# Patient Record
Sex: MALE | Race: WHITE | Employment: OTHER | URBAN - METROPOLITAN AREA
[De-identification: names, ages, dates, MRNs, and addresses within clinical notes are randomized per-mention and may not be internally consistent; named-entity substitution may affect disease eponyms.]

---

## 2018-06-06 ENCOUNTER — HOSPITAL ENCOUNTER (OUTPATIENT)
Age: 78
Discharge: HOME | End: 2018-06-08
Payer: MEDICARE

## 2018-06-06 LAB
ALBUMIN SERPL-MCNC: 3.8 G/DL (ref 3.5–5.2)
ALP BLD-CCNC: 55 U/L (ref 40–129)
ALT SERPL-CCNC: 20 U/L (ref 0–40)
ANION GAP SERPL CALCULATED.3IONS-SCNC: 14 MMOL/L (ref 7–16)
AST SERPL-CCNC: 19 U/L (ref 0–39)
BASOPHILS ABSOLUTE: 0.05 E9/L (ref 0–0.2)
BASOPHILS RELATIVE PERCENT: 0.6 % (ref 0–2)
BILIRUB SERPL-MCNC: 0.7 MG/DL (ref 0–1.2)
BUN BLDV-MCNC: 18 MG/DL (ref 8–23)
CALCIUM SERPL-MCNC: 8.7 MG/DL (ref 8.6–10.2)
CHLORIDE BLD-SCNC: 100 MMOL/L (ref 98–107)
CHOLESTEROL, TOTAL: 152 MG/DL (ref 0–199)
CO2: 27 MMOL/L (ref 22–29)
CREAT SERPL-MCNC: 1.2 MG/DL (ref 0.7–1.2)
EOSINOPHILS ABSOLUTE: 1.58 E9/L (ref 0.05–0.5)
EOSINOPHILS RELATIVE PERCENT: 17.9 % (ref 0–6)
GFR AFRICAN AMERICAN: >60
GFR NON-AFRICAN AMERICAN: 58 ML/MIN/1.73
GLUCOSE BLD-MCNC: 97 MG/DL (ref 74–109)
HCT VFR BLD CALC: 47.2 % (ref 37–54)
HDLC SERPL-MCNC: 31 MG/DL
HEMOGLOBIN: 15.4 G/DL (ref 12.5–16.5)
IMMATURE GRANULOCYTES #: 0.04 E9/L
IMMATURE GRANULOCYTES %: 0.5 % (ref 0–5)
LDL CHOLESTEROL CALCULATED: 88 MG/DL (ref 0–99)
LYMPHOCYTES ABSOLUTE: 2 E9/L (ref 1.5–4)
LYMPHOCYTES RELATIVE PERCENT: 22.7 % (ref 20–42)
MCH RBC QN AUTO: 30.7 PG (ref 26–35)
MCHC RBC AUTO-ENTMCNC: 32.6 % (ref 32–34.5)
MCV RBC AUTO: 94.2 FL (ref 80–99.9)
MONOCYTES ABSOLUTE: 0.77 E9/L (ref 0.1–0.95)
MONOCYTES RELATIVE PERCENT: 8.7 % (ref 2–12)
NEUTROPHILS ABSOLUTE: 4.38 E9/L (ref 1.8–7.3)
NEUTROPHILS RELATIVE PERCENT: 49.6 % (ref 43–80)
PDW BLD-RTO: 15.5 FL (ref 11.5–15)
PLATELET # BLD: 168 E9/L (ref 130–450)
PMV BLD AUTO: 9.5 FL (ref 7–12)
POTASSIUM SERPL-SCNC: 4.5 MMOL/L (ref 3.5–5)
PROSTATE SPECIFIC ANTIGEN: 7.02 NG/ML (ref 0–4)
RBC # BLD: 5.01 E12/L (ref 3.8–5.8)
SODIUM BLD-SCNC: 141 MMOL/L (ref 132–146)
TOTAL PROTEIN: 6.6 G/DL (ref 6.4–8.3)
TRIGL SERPL-MCNC: 166 MG/DL (ref 0–149)
TSH SERPL DL<=0.05 MIU/L-ACNC: 1.25 UIU/ML (ref 0.27–4.2)
VITAMIN D 25-HYDROXY: 29 NG/ML (ref 30–100)
VLDLC SERPL CALC-MCNC: 33 MG/DL
WBC # BLD: 8.8 E9/L (ref 4.5–11.5)

## 2018-06-06 PROCEDURE — 84443 ASSAY THYROID STIM HORMONE: CPT

## 2018-06-06 PROCEDURE — 82306 VITAMIN D 25 HYDROXY: CPT

## 2018-06-06 PROCEDURE — 85025 COMPLETE CBC W/AUTO DIFF WBC: CPT

## 2018-06-06 PROCEDURE — 80053 COMPREHEN METABOLIC PANEL: CPT

## 2018-06-06 PROCEDURE — 80061 LIPID PANEL: CPT

## 2018-06-06 PROCEDURE — G0103 PSA SCREENING: HCPCS

## 2020-05-26 ENCOUNTER — TELEPHONE (OUTPATIENT)
Dept: PRIMARY CARE CLINIC | Age: 80
End: 2020-05-26

## 2020-05-26 NOTE — TELEPHONE ENCOUNTER
Dr Lopez Pitch pt asking for ref to Dr Leo Arreaga in [place] for ortho. He has right hip pain. He was previously seeing Denise Agee.  Can you place referral

## 2020-06-02 ENCOUNTER — TELEPHONE (OUTPATIENT)
Dept: PRIMARY CARE CLINIC | Age: 80
End: 2020-06-02

## 2020-06-08 RX ORDER — ALBUTEROL SULFATE 90 UG/1
2 AEROSOL, METERED RESPIRATORY (INHALATION) EVERY 4 HOURS PRN
COMMUNITY
End: 2021-05-09

## 2020-06-08 RX ORDER — BUDESONIDE AND FORMOTEROL FUMARATE DIHYDRATE 80; 4.5 UG/1; UG/1
2 AEROSOL RESPIRATORY (INHALATION) 2 TIMES DAILY
COMMUNITY
End: 2021-05-09

## 2020-06-08 RX ORDER — LOSARTAN POTASSIUM 50 MG/1
50 TABLET ORAL DAILY
COMMUNITY
End: 2021-06-02 | Stop reason: SDUPTHER

## 2020-06-09 ENCOUNTER — HOSPITAL ENCOUNTER (OUTPATIENT)
Dept: GENERAL RADIOLOGY | Age: 80
Discharge: HOME | End: 2020-06-11
Payer: MEDICARE

## 2020-06-09 ENCOUNTER — HOSPITAL ENCOUNTER (OUTPATIENT)
Age: 80
Discharge: HOME | End: 2020-06-11
Payer: MEDICARE

## 2020-06-09 ENCOUNTER — HOSPITAL ENCOUNTER (OUTPATIENT)
Age: 80
Discharge: HOME | End: 2020-06-09
Payer: MEDICARE

## 2020-06-09 LAB
ALBUMIN SERPL-MCNC: 3.9 G/DL (ref 3.5–5.2)
ALP BLD-CCNC: 60 U/L (ref 40–129)
ALT SERPL-CCNC: 16 U/L (ref 0–40)
ANION GAP SERPL CALCULATED.3IONS-SCNC: 6 MMOL/L (ref 7–16)
AST SERPL-CCNC: 18 U/L (ref 0–39)
BASOPHILS ABSOLUTE: 0.04 E9/L (ref 0–0.2)
BASOPHILS RELATIVE PERCENT: 0.6 % (ref 0–2)
BILIRUB SERPL-MCNC: 0.8 MG/DL (ref 0–1.2)
BILIRUBIN URINE: NEGATIVE
BLOOD, URINE: NEGATIVE
BUN BLDV-MCNC: 17 MG/DL (ref 8–23)
CALCIUM SERPL-MCNC: 9.1 MG/DL (ref 8.6–10.2)
CHLORIDE BLD-SCNC: 101 MMOL/L (ref 98–107)
CLARITY: CLEAR
CO2: 30 MMOL/L (ref 22–29)
COLOR: YELLOW
CREAT SERPL-MCNC: 1.2 MG/DL (ref 0.7–1.2)
EOSINOPHILS ABSOLUTE: 0.67 E9/L (ref 0.05–0.5)
EOSINOPHILS RELATIVE PERCENT: 9.7 % (ref 0–6)
GFR AFRICAN AMERICAN: >60
GFR NON-AFRICAN AMERICAN: 58 ML/MIN/1.73
GLUCOSE BLD-MCNC: 100 MG/DL (ref 74–99)
GLUCOSE URINE: NEGATIVE MG/DL
HCT VFR BLD CALC: 45.8 % (ref 37–54)
HEMOGLOBIN: 15.8 G/DL (ref 12.5–16.5)
IMMATURE GRANULOCYTES #: 0.04 E9/L
IMMATURE GRANULOCYTES %: 0.6 % (ref 0–5)
KETONES, URINE: NEGATIVE MG/DL
LEUKOCYTE ESTERASE, URINE: NEGATIVE
LYMPHOCYTES ABSOLUTE: 1.51 E9/L (ref 1.5–4)
LYMPHOCYTES RELATIVE PERCENT: 21.9 % (ref 20–42)
MCH RBC QN AUTO: 31.7 PG (ref 26–35)
MCHC RBC AUTO-ENTMCNC: 34.5 % (ref 32–34.5)
MCV RBC AUTO: 91.8 FL (ref 80–99.9)
MONOCYTES ABSOLUTE: 0.54 E9/L (ref 0.1–0.95)
MONOCYTES RELATIVE PERCENT: 7.8 % (ref 2–12)
NEUTROPHILS ABSOLUTE: 4.11 E9/L (ref 1.8–7.3)
NEUTROPHILS RELATIVE PERCENT: 59.4 % (ref 43–80)
NITRITE, URINE: NEGATIVE
PDW BLD-RTO: 14 FL (ref 11.5–15)
PH UA: 5.5 (ref 5–9)
PLATELET # BLD: 161 E9/L (ref 130–450)
PMV BLD AUTO: 9.1 FL (ref 7–12)
POTASSIUM SERPL-SCNC: 4.1 MMOL/L (ref 3.5–5)
PROTEIN UA: NEGATIVE MG/DL
RBC # BLD: 4.99 E12/L (ref 3.8–5.8)
SODIUM BLD-SCNC: 137 MMOL/L (ref 132–146)
SPECIFIC GRAVITY UA: 1.02 (ref 1–1.03)
TOTAL PROTEIN: 6.8 G/DL (ref 6.4–8.3)
UROBILINOGEN, URINE: 1 E.U./DL
WBC # BLD: 6.9 E9/L (ref 4.5–11.5)

## 2020-06-09 PROCEDURE — 80053 COMPREHEN METABOLIC PANEL: CPT

## 2020-06-09 PROCEDURE — 87088 URINE BACTERIA CULTURE: CPT

## 2020-06-09 PROCEDURE — 85025 COMPLETE CBC W/AUTO DIFF WBC: CPT

## 2020-06-09 PROCEDURE — 71046 X-RAY EXAM CHEST 2 VIEWS: CPT

## 2020-06-09 PROCEDURE — 36415 COLL VENOUS BLD VENIPUNCTURE: CPT

## 2020-06-09 PROCEDURE — 81003 URINALYSIS AUTO W/O SCOPE: CPT

## 2020-06-11 LAB — URINE CULTURE, ROUTINE: NORMAL

## 2020-06-12 ENCOUNTER — OFFICE VISIT (OUTPATIENT)
Dept: PRIMARY CARE CLINIC | Age: 80
End: 2020-06-12
Payer: MEDICARE

## 2020-06-12 VITALS
TEMPERATURE: 98.1 F | SYSTOLIC BLOOD PRESSURE: 132 MMHG | WEIGHT: 206 LBS | BODY MASS INDEX: 27.94 KG/M2 | HEART RATE: 78 BPM | DIASTOLIC BLOOD PRESSURE: 70 MMHG | OXYGEN SATURATION: 98 %

## 2020-06-12 PROCEDURE — 4040F PNEUMOC VAC/ADMIN/RCVD: CPT | Performed by: FAMILY MEDICINE

## 2020-06-12 PROCEDURE — 99214 OFFICE O/P EST MOD 30 MIN: CPT | Performed by: FAMILY MEDICINE

## 2020-06-12 PROCEDURE — G8419 CALC BMI OUT NRM PARAM NOF/U: HCPCS | Performed by: FAMILY MEDICINE

## 2020-06-12 PROCEDURE — 4004F PT TOBACCO SCREEN RCVD TLK: CPT | Performed by: FAMILY MEDICINE

## 2020-06-12 PROCEDURE — 1123F ACP DISCUSS/DSCN MKR DOCD: CPT | Performed by: FAMILY MEDICINE

## 2020-06-12 PROCEDURE — G8428 CUR MEDS NOT DOCUMENT: HCPCS | Performed by: FAMILY MEDICINE

## 2020-06-12 RX ORDER — BUDESONIDE AND FORMOTEROL FUMARATE DIHYDRATE 80; 4.5 UG/1; UG/1
AEROSOL RESPIRATORY (INHALATION)
COMMUNITY
End: 2020-07-14

## 2020-06-12 RX ORDER — ALBUTEROL SULFATE 90 UG/1
AEROSOL, METERED RESPIRATORY (INHALATION)
COMMUNITY
End: 2020-07-14

## 2020-06-12 RX ORDER — FLUTICASONE PROPIONATE 50 MCG
SPRAY, SUSPENSION (ML) NASAL
COMMUNITY
End: 2020-07-14

## 2020-06-12 RX ORDER — METOPROLOL SUCCINATE 50 MG/1
TABLET, EXTENDED RELEASE ORAL
COMMUNITY
Start: 2020-04-08 | End: 2021-10-11 | Stop reason: SDUPTHER

## 2020-06-12 RX ORDER — METOPROLOL SUCCINATE 25 MG/1
TABLET, EXTENDED RELEASE ORAL
COMMUNITY
Start: 2016-10-10 | End: 2020-07-14

## 2020-06-12 RX ORDER — APIXABAN 5 MG/1
TABLET, FILM COATED ORAL
COMMUNITY
Start: 2020-03-20 | End: 2020-07-14

## 2020-06-12 RX ORDER — GUAIFENESIN 600 MG/1
TABLET, EXTENDED RELEASE ORAL
COMMUNITY
End: 2020-07-14

## 2020-06-12 RX ORDER — OMEPRAZOLE 40 MG/1
CAPSULE, DELAYED RELEASE ORAL
COMMUNITY
Start: 2016-08-11 | End: 2020-07-14

## 2020-06-12 RX ORDER — LOSARTAN POTASSIUM 50 MG/1
TABLET ORAL
COMMUNITY
End: 2020-07-14

## 2020-06-12 RX ORDER — ONDANSETRON 4 MG/1
TABLET, ORALLY DISINTEGRATING ORAL
COMMUNITY
Start: 2017-03-29 | End: 2020-07-14

## 2020-06-12 RX ORDER — FLECAINIDE ACETATE 100 MG/1
TABLET ORAL
COMMUNITY
End: 2020-07-14

## 2020-06-12 RX ORDER — DOXYCYCLINE HYCLATE 100 MG/1
CAPSULE ORAL
COMMUNITY
End: 2020-07-14

## 2020-06-12 ASSESSMENT — ENCOUNTER SYMPTOMS
GASTROINTESTINAL NEGATIVE: 1
EYES NEGATIVE: 1
ALLERGIC/IMMUNOLOGIC NEGATIVE: 1
RESPIRATORY NEGATIVE: 1

## 2020-06-12 NOTE — PROGRESS NOTES
aneurysm    Stroke Father     Other Father         pacemaker    Cancer Sister      Social History     Tobacco Use    Smoking status: Never Smoker    Smokeless tobacco: Never Used   Substance Use Topics    Alcohol use: Yes     Alcohol/week: 2.0 standard drinks     Types: 1 Cans of beer, 1 Shots of liquor per week     Comment: 2 drinks 5 days a week    Drug use: No        Vitals:    06/12/20 1039   BP: 132/70   Pulse: 78   Temp: 98.1 °F (36.7 °C)   SpO2: 98%   Weight: 206 lb (93.4 kg)       Physical Exam  Vitals signs reviewed. Constitutional:       Appearance: Normal appearance. HENT:      Head: Normocephalic and atraumatic. Right Ear: Tympanic membrane, ear canal and external ear normal.      Left Ear: Tympanic membrane, ear canal and external ear normal.      Nose: Nose normal.   Eyes:      Extraocular Movements: Extraocular movements intact. Conjunctiva/sclera: Conjunctivae normal.      Pupils: Pupils are equal, round, and reactive to light. Neck:      Musculoskeletal: Normal range of motion and neck supple. Vascular: No carotid bruit. Cardiovascular:      Rate and Rhythm: Normal rate and regular rhythm. Heart sounds: No murmur. Pulmonary:      Effort: Pulmonary effort is normal.      Breath sounds: Normal breath sounds. Abdominal:      General: Abdomen is flat. Bowel sounds are normal.      Palpations: Abdomen is soft. Musculoskeletal:         General: Tenderness present. Skin:     General: Skin is warm and dry. Neurological:      General: No focal deficit present. Mental Status: He is alert and oriented to person, place, and time. Gait: Gait abnormal.   Psychiatric:         Mood and Affect: Mood normal.         Behavior: Behavior normal.         Thought Content:  Thought content normal.         Judgment: Judgment normal.         Assessment and Plan:  Uday Man was seen today for pre-op exam.    Diagnoses and all orders for this visit:    Pre-op

## 2020-06-15 ENCOUNTER — TELEPHONE (OUTPATIENT)
Dept: PRIMARY CARE CLINIC | Age: 80
End: 2020-06-15

## 2020-06-15 NOTE — TELEPHONE ENCOUNTER
Patient in need of MRSA testing. Patient would like to have done here in office tomorrow if possible due to his surgery being next week. Please place order and let patient know when he can come in.

## 2020-06-16 ENCOUNTER — NURSE ONLY (OUTPATIENT)
Dept: PRIMARY CARE CLINIC | Age: 80
End: 2020-06-16

## 2020-06-16 ENCOUNTER — HOSPITAL ENCOUNTER (OUTPATIENT)
Age: 80
Discharge: HOME | End: 2020-06-18
Payer: MEDICARE

## 2020-06-16 PROCEDURE — 87081 CULTURE SCREEN ONLY: CPT

## 2020-06-18 LAB — MRSA CULTURE ONLY: NORMAL

## 2020-07-14 ENCOUNTER — OFFICE VISIT (OUTPATIENT)
Dept: PRIMARY CARE CLINIC | Age: 80
End: 2020-07-14
Payer: MEDICARE

## 2020-07-14 VITALS
TEMPERATURE: 97.6 F | WEIGHT: 198 LBS | HEIGHT: 72 IN | DIASTOLIC BLOOD PRESSURE: 60 MMHG | RESPIRATION RATE: 18 BRPM | SYSTOLIC BLOOD PRESSURE: 115 MMHG | OXYGEN SATURATION: 98 % | HEART RATE: 64 BPM | BODY MASS INDEX: 26.82 KG/M2

## 2020-07-14 PROBLEM — R39.11 BENIGN PROSTATIC HYPERPLASIA WITH URINARY HESITANCY: Status: ACTIVE | Noted: 2020-07-14

## 2020-07-14 PROBLEM — N40.1 BENIGN PROSTATIC HYPERPLASIA WITH URINARY HESITANCY: Status: ACTIVE | Noted: 2020-07-14

## 2020-07-14 PROBLEM — Z79.01 ANTICOAGULATED: Status: ACTIVE | Noted: 2020-07-14

## 2020-07-14 PROBLEM — M16.11 PRIMARY OSTEOARTHRITIS OF RIGHT HIP: Status: ACTIVE | Noted: 2020-07-14

## 2020-07-14 PROBLEM — Z86.79 HISTORY OF ATRIAL FIBRILLATION: Status: ACTIVE | Noted: 2020-07-14

## 2020-07-14 PROCEDURE — 99214 OFFICE O/P EST MOD 30 MIN: CPT | Performed by: INTERNAL MEDICINE

## 2020-07-14 ASSESSMENT — PATIENT HEALTH QUESTIONNAIRE - PHQ9
SUM OF ALL RESPONSES TO PHQ9 QUESTIONS 1 & 2: 0
2. FEELING DOWN, DEPRESSED OR HOPELESS: 0
1. LITTLE INTEREST OR PLEASURE IN DOING THINGS: 0
SUM OF ALL RESPONSES TO PHQ QUESTIONS 1-9: 0
SUM OF ALL RESPONSES TO PHQ QUESTIONS 1-9: 0

## 2020-07-19 ENCOUNTER — HOSPITAL ENCOUNTER (EMERGENCY)
Age: 80
Discharge: HOME | End: 2020-07-20
Attending: EMERGENCY MEDICINE
Payer: MEDICARE

## 2020-07-19 ENCOUNTER — APPOINTMENT (OUTPATIENT)
Dept: GENERAL RADIOLOGY | Age: 80
End: 2020-07-19
Payer: MEDICARE

## 2020-07-19 ENCOUNTER — APPOINTMENT (OUTPATIENT)
Dept: ULTRASOUND IMAGING | Age: 80
End: 2020-07-19
Payer: MEDICARE

## 2020-07-19 LAB
ALBUMIN SERPL-MCNC: 3.8 G/DL (ref 3.5–5.2)
ALP BLD-CCNC: 108 U/L (ref 40–129)
ALT SERPL-CCNC: 16 U/L (ref 0–40)
ANION GAP SERPL CALCULATED.3IONS-SCNC: 10 MMOL/L (ref 7–16)
AST SERPL-CCNC: 16 U/L (ref 0–39)
BASOPHILS ABSOLUTE: 0.02 E9/L (ref 0–0.2)
BASOPHILS RELATIVE PERCENT: 0.3 % (ref 0–2)
BILIRUB SERPL-MCNC: 0.4 MG/DL (ref 0–1.2)
BUN BLDV-MCNC: 20 MG/DL (ref 8–23)
CALCIUM SERPL-MCNC: 9.2 MG/DL (ref 8.6–10.2)
CHLORIDE BLD-SCNC: 102 MMOL/L (ref 98–107)
CO2: 29 MMOL/L (ref 22–29)
CREAT SERPL-MCNC: 1.3 MG/DL (ref 0.7–1.2)
EOSINOPHILS ABSOLUTE: 0.09 E9/L (ref 0.05–0.5)
EOSINOPHILS RELATIVE PERCENT: 1.4 % (ref 0–6)
GFR AFRICAN AMERICAN: >60
GFR NON-AFRICAN AMERICAN: 53 ML/MIN/1.73
GLUCOSE BLD-MCNC: 112 MG/DL (ref 74–99)
HCT VFR BLD CALC: 38.1 % (ref 37–54)
HEMOGLOBIN: 12.8 G/DL (ref 12.5–16.5)
IMMATURE GRANULOCYTES #: 0.03 E9/L
IMMATURE GRANULOCYTES %: 0.5 % (ref 0–5)
LACTIC ACID, SEPSIS: 1.2 MMOL/L (ref 0.5–1.9)
LIPASE: 32 U/L (ref 13–60)
LYMPHOCYTES ABSOLUTE: 1.03 E9/L (ref 1.5–4)
LYMPHOCYTES RELATIVE PERCENT: 16.5 % (ref 20–42)
MCH RBC QN AUTO: 31.2 PG (ref 26–35)
MCHC RBC AUTO-ENTMCNC: 33.6 % (ref 32–34.5)
MCV RBC AUTO: 92.9 FL (ref 80–99.9)
MONOCYTES ABSOLUTE: 0.6 E9/L (ref 0.1–0.95)
MONOCYTES RELATIVE PERCENT: 9.6 % (ref 2–12)
NEUTROPHILS ABSOLUTE: 4.48 E9/L (ref 1.8–7.3)
NEUTROPHILS RELATIVE PERCENT: 71.7 % (ref 43–80)
PDW BLD-RTO: 13.9 FL (ref 11.5–15)
PLATELET # BLD: 173 E9/L (ref 130–450)
PMV BLD AUTO: 9.5 FL (ref 7–12)
POTASSIUM REFLEX MAGNESIUM: 4.5 MMOL/L (ref 3.5–5)
RBC # BLD: 4.1 E12/L (ref 3.8–5.8)
SODIUM BLD-SCNC: 141 MMOL/L (ref 132–146)
TOTAL PROTEIN: 6.7 G/DL (ref 6.4–8.3)
TROPONIN: <0.01 NG/ML (ref 0–0.03)
WBC # BLD: 6.3 E9/L (ref 4.5–11.5)

## 2020-07-19 PROCEDURE — 93005 ELECTROCARDIOGRAM TRACING: CPT | Performed by: STUDENT IN AN ORGANIZED HEALTH CARE EDUCATION/TRAINING PROGRAM

## 2020-07-19 PROCEDURE — 85025 COMPLETE CBC W/AUTO DIFF WBC: CPT

## 2020-07-19 PROCEDURE — 99285 EMERGENCY DEPT VISIT HI MDM: CPT

## 2020-07-19 PROCEDURE — 83605 ASSAY OF LACTIC ACID: CPT

## 2020-07-19 PROCEDURE — 83690 ASSAY OF LIPASE: CPT

## 2020-07-19 PROCEDURE — 71045 X-RAY EXAM CHEST 1 VIEW: CPT

## 2020-07-19 PROCEDURE — 80053 COMPREHEN METABOLIC PANEL: CPT

## 2020-07-19 PROCEDURE — 84484 ASSAY OF TROPONIN QUANT: CPT

## 2020-07-19 PROCEDURE — 76705 ECHO EXAM OF ABDOMEN: CPT

## 2020-07-19 ASSESSMENT — PAIN DESCRIPTION - FREQUENCY: FREQUENCY: CONTINUOUS

## 2020-07-19 ASSESSMENT — PAIN DESCRIPTION - LOCATION: LOCATION: ABDOMEN;CHEST

## 2020-07-19 ASSESSMENT — PAIN SCALES - GENERAL: PAINLEVEL_OUTOF10: 4

## 2020-07-19 ASSESSMENT — PAIN DESCRIPTION - ORIENTATION: ORIENTATION: LEFT

## 2020-07-19 ASSESSMENT — PAIN DESCRIPTION - PAIN TYPE: TYPE: ACUTE PAIN

## 2020-07-19 ASSESSMENT — PAIN DESCRIPTION - DESCRIPTORS: DESCRIPTORS: ACHING

## 2020-07-20 VITALS
OXYGEN SATURATION: 97 % | BODY MASS INDEX: 26.82 KG/M2 | DIASTOLIC BLOOD PRESSURE: 76 MMHG | HEART RATE: 75 BPM | WEIGHT: 198 LBS | RESPIRATION RATE: 14 BRPM | HEIGHT: 72 IN | SYSTOLIC BLOOD PRESSURE: 159 MMHG | TEMPERATURE: 99 F

## 2020-07-20 LAB
EKG ATRIAL RATE: 76 BPM
EKG P AXIS: 102 DEGREES
EKG P-R INTERVAL: 224 MS
EKG Q-T INTERVAL: 404 MS
EKG QRS DURATION: 134 MS
EKG QTC CALCULATION (BAZETT): 454 MS
EKG R AXIS: -63 DEGREES
EKG T AXIS: 25 DEGREES
EKG VENTRICULAR RATE: 76 BPM

## 2020-07-20 PROCEDURE — 93010 ELECTROCARDIOGRAM REPORT: CPT | Performed by: INTERNAL MEDICINE

## 2020-07-20 ASSESSMENT — ENCOUNTER SYMPTOMS
COUGH: 0
EYE PAIN: 0
SINUS PRESSURE: 0
WHEEZING: 0
SORE THROAT: 0
VOMITING: 0
SHORTNESS OF BREATH: 0
BACK PAIN: 1
EYE REDNESS: 0
NAUSEA: 0
DIARRHEA: 0
EYE DISCHARGE: 0
CONSTIPATION: 0
ABDOMINAL PAIN: 1

## 2020-07-21 ENCOUNTER — OFFICE VISIT (OUTPATIENT)
Dept: PRIMARY CARE CLINIC | Age: 80
End: 2020-07-21
Payer: MEDICARE

## 2020-07-21 VITALS
RESPIRATION RATE: 18 BRPM | WEIGHT: 198 LBS | TEMPERATURE: 97.2 F | SYSTOLIC BLOOD PRESSURE: 110 MMHG | DIASTOLIC BLOOD PRESSURE: 68 MMHG | HEART RATE: 74 BPM | OXYGEN SATURATION: 96 % | HEIGHT: 72 IN | BODY MASS INDEX: 26.82 KG/M2

## 2020-07-21 PROBLEM — R10.10 UPPER ABDOMINAL PAIN: Status: ACTIVE | Noted: 2020-07-21

## 2020-07-21 PROCEDURE — 99213 OFFICE O/P EST LOW 20 MIN: CPT | Performed by: INTERNAL MEDICINE

## 2020-07-21 NOTE — PROGRESS NOTES
Kinza Osorio  7/21/20     Chief Complaint   Patient presents with    Follow-up     7/19/20 ER for chest pain and stomache pain        No Known Allergies     Current Outpatient Medications   Medication Sig Dispense Refill    metoprolol succinate (TOPROL XL) 50 MG extended release tablet TAKE 1 TABLET BY MOUTH ONCE DAILY      losartan (COZAAR) 50 MG tablet Take 50 mg by mouth daily      budesonide-formoterol (SYMBICORT) 80-4.5 MCG/ACT AERO Inhale 2 puffs into the lungs 2 times daily      albuterol sulfate HFA (VENTOLIN HFA) 108 (90 Base) MCG/ACT inhaler Inhale 2 puffs into the lungs every 4 hours as needed for Wheezing      apixaban (ELIQUIS) 5 MG TABS tablet Take 1 tablet by mouth 2 times daily 60 tablet 5    omeprazole (PRILOSEC) 40 MG capsule Take 40 mg by mouth daily        No current facility-administered medications for this visit. HPI: Patient comes in for follow-up visit. He was in the emergency room 2 days ago planing of upper abdominal pain which started spontaneously. Chest x-ray revealed no acute pathology. Gallbladder ultrasound revealed a small stone or polyp adherent to the wall of the gallbladder. There is no evidence of gallbladder wall thickening or inflammation. His symptoms spontaneously resolved while he was in the ER. He was advised to have a CAT scan of the abdomen but he declined and left AMA. He states that he has not had any further symptoms since then. He is going to see his orthopedic surgeon in [facility] for a follow-up visit regarding his recent right total hip arthroplasty and he is doing fine in that regard and will be starting physical therapy after his visit there. Remains on all of his usual meds and supplements the same as listed on his med list.    Review of Systems: as per HPI      Physical Exam: Patient appears to be in no distress. He is alert and oriented. Breathing comfortably. Heart regular rhythm no murmurs gallops or rubs. Lungs clear.   Abdomen soft and nontender. No masses organomegaly noted. Bowel sounds normal.      Assessment:      Upper abdominal pain, transient and etiology uncertain. Possible small gallstone noted on ultrasound. Cannot rule out possibility of spontaneously passed common bile duct stone. Discussion Notes: His labs and imaging done at the emergency room were reviewed. No further evaluation or treatment at this time and if she has any recurring symptoms. He will follow-up with his orthopedic surgeon as per his instructions and proceed with physical therapy as ordered. If he has any recurring upper abdominal discomfort he will need further evaluation. He is advised to return to the ER if his symptoms recur.

## 2020-09-09 ENCOUNTER — TELEPHONE (OUTPATIENT)
Dept: PRIMARY CARE CLINIC | Age: 80
End: 2020-09-09

## 2020-09-09 NOTE — TELEPHONE ENCOUNTER
Ask him if he is passing any blood. Ask him if he has any fever or chills or abdominal pain. If no other symptoms other than diarrhea he may try original Pepto-Bismol in addition to the Imodium and see if that helps. He should avoid any dairy products or magnesium or magnesium-based antacids such as Mylanta or Maalox.

## 2020-11-02 ENCOUNTER — OFFICE VISIT (OUTPATIENT)
Dept: PRIMARY CARE CLINIC | Age: 80
End: 2020-11-02
Payer: MEDICARE

## 2020-11-02 VITALS
WEIGHT: 198 LBS | BODY MASS INDEX: 26.82 KG/M2 | DIASTOLIC BLOOD PRESSURE: 80 MMHG | HEART RATE: 76 BPM | SYSTOLIC BLOOD PRESSURE: 112 MMHG | TEMPERATURE: 98.1 F | OXYGEN SATURATION: 98 % | HEIGHT: 72 IN

## 2020-11-02 PROCEDURE — 1036F TOBACCO NON-USER: CPT | Performed by: NURSE PRACTITIONER

## 2020-11-02 PROCEDURE — G8427 DOCREV CUR MEDS BY ELIG CLIN: HCPCS | Performed by: NURSE PRACTITIONER

## 2020-11-02 PROCEDURE — 4040F PNEUMOC VAC/ADMIN/RCVD: CPT | Performed by: NURSE PRACTITIONER

## 2020-11-02 PROCEDURE — G8484 FLU IMMUNIZE NO ADMIN: HCPCS | Performed by: NURSE PRACTITIONER

## 2020-11-02 PROCEDURE — G8417 CALC BMI ABV UP PARAM F/U: HCPCS | Performed by: NURSE PRACTITIONER

## 2020-11-02 PROCEDURE — 1123F ACP DISCUSS/DSCN MKR DOCD: CPT | Performed by: NURSE PRACTITIONER

## 2020-11-02 PROCEDURE — 99213 OFFICE O/P EST LOW 20 MIN: CPT | Performed by: NURSE PRACTITIONER

## 2020-11-02 NOTE — PROGRESS NOTES
Chief Complaint   Fever (Started 3 days ago) and Fatigue      History of Present Illness   Source of history provided by:  patient. Shae Monk is a 79 y.o. old male who presents to the flu clinic with complaints of Fever, Chills and Fatigue x 3 days. States symptoms have improved since onset. Has been taking Acetaminophen without symptomatic relief. Denies any Cough, Shortness of breath, Nausea, Vomiting, Chest Pain, Abdominal Pain, Rash, Lethargy or Close contact with a lab confirmed COVID-19 patient within 14 days of symptom onset . Denies any hx of asthma, COPD or emphysema. ROS   Pertinent positives and negatives are stated within HPI, all other systems reviewed and are negative. Past Medical History:  has a past medical history of Aortic valve regurgitation, Arrhythmia, Atrial fibrillation ([address] Dizziness, GERD (gastroesophageal reflux disease), Heartburn, History of stress test, Hypertension, and Lightheadedness. Past Surgical History:  has a past surgical history that includes Tonsillectomy; pacemaker placement; and other surgical history. Social History:  reports that he has never smoked. He has never used smokeless tobacco. He reports current alcohol use of about 2.0 standard drinks of alcohol per week. He reports that he does not use drugs. Family History: family history includes Cancer in his sister; Other in his father and mother; Stroke in his father. Allergies: Patient has no known allergies. Physical Exam   Vital Signs:  /80   Pulse 76   Temp 98.1 °F (36.7 °C)   Ht 6' (1.829 m)   Wt 198 lb (89.8 kg)   SpO2 98%   BMI 26.85 kg/m²    Oxygen Saturation Interpretation: Normal.    Constitutional:  Alert, development consistent with age. NAD. Head:  NC/NT. Airway patent. Ears: TMs clear bilaterally. Canals without exudate or swelling bilaterally. Mouth: Posterior pharynx with mild erythema and clear postnasal drip. There is no tonsillar hypertrophy or exudate. Neck:  Normal ROM. Supple. There is no anterior cervical adenopathy noted. Lungs: CTAB without wheezes, rales, or rhonchi. CV:  Regular rate and rhythm, normal heart sounds, without pathological murmurs, ectopy, gallops, or rubs. Skin:  Normal turgor. Warm, dry, without visible rash. Lymphatic: No lymphangitis or adenopathy noted. Neurological:  Oriented. Motor functions intact. Lab / Imaging Results   (All laboratory and radiology results have been personally reviewed by myself)  Labs:  No results found for this visit on 11/02/20. Imaging: All Radiology results interpreted by Radiologist unless otherwise noted. No results found. Medical Decision Making   Pt non-toxic, in no apparent distress and stable at time of discharge. Assessment/Plan   Callie Mcfadden was seen today for fever and fatigue. Diagnoses and all orders for this visit:    Viral illness    Encounter for laboratory testing for COVID-19 virus  -     COVID-19 Ambulatory; Future      COVID-19 swab obtained and pending, will call with results once available. Advised self-quarantine at home in the interim. Increase fluids and rest. Symptomatic relief discussed including Tylenol prn pain/fever. Schedule f/u with PCP in 7-10 days if symptoms persist. ED sooner if symptoms worsen or change. ED immediately with high or refractory fever, progressive SOB, dyspnea, CP, calf pain/swelling, shaking chills, vomiting, abdominal pain, lethargy, flank pain, or decreased urinary output. Pt verbalizes understanding and is in agreement with plan of care. All questions answered. Anat & Frederick, APRN - CNP    This visit was provided as a focused evaluation during the COVID -19 pandemic/national emergency. A comprehensive review of all previous patient history and testing was not conducted. Pertinent findings were elicited during the visit.

## 2020-11-03 DIAGNOSIS — Z20.822 ENCOUNTER FOR LABORATORY TESTING FOR COVID-19 VIRUS: ICD-10-CM

## 2020-11-04 LAB
SARS-COV-2: NOT DETECTED
SOURCE: NORMAL

## 2020-11-21 ENCOUNTER — HOSPITAL ENCOUNTER (EMERGENCY)
Age: 80
Discharge: HOME | End: 2020-11-21
Attending: EMERGENCY MEDICINE
Payer: MEDICARE

## 2020-11-21 VITALS
OXYGEN SATURATION: 97 % | WEIGHT: 195 LBS | HEART RATE: 72 BPM | DIASTOLIC BLOOD PRESSURE: 82 MMHG | TEMPERATURE: 98 F | HEIGHT: 72 IN | RESPIRATION RATE: 16 BRPM | SYSTOLIC BLOOD PRESSURE: 194 MMHG | BODY MASS INDEX: 26.41 KG/M2

## 2020-11-21 LAB
ALBUMIN SERPL-MCNC: 3.8 G/DL (ref 3.5–5.2)
ALP BLD-CCNC: 77 U/L (ref 40–129)
ALT SERPL-CCNC: 12 U/L (ref 0–40)
ANION GAP SERPL CALCULATED.3IONS-SCNC: 5 MMOL/L (ref 7–16)
AST SERPL-CCNC: 21 U/L (ref 0–39)
BASOPHILS ABSOLUTE: 0.01 E9/L (ref 0–0.2)
BASOPHILS RELATIVE PERCENT: 0.2 % (ref 0–2)
BILIRUB SERPL-MCNC: 0.8 MG/DL (ref 0–1.2)
BUN BLDV-MCNC: 17 MG/DL (ref 8–23)
CALCIUM SERPL-MCNC: 8.9 MG/DL (ref 8.6–10.2)
CHLORIDE BLD-SCNC: 103 MMOL/L (ref 98–107)
CO2: 29 MMOL/L (ref 22–29)
CREAT SERPL-MCNC: 1.2 MG/DL (ref 0.7–1.2)
EOSINOPHILS ABSOLUTE: 0.07 E9/L (ref 0.05–0.5)
EOSINOPHILS RELATIVE PERCENT: 1.7 % (ref 0–6)
GFR AFRICAN AMERICAN: >60
GFR NON-AFRICAN AMERICAN: 58 ML/MIN/1.73
GLUCOSE BLD-MCNC: 114 MG/DL (ref 74–99)
HCT VFR BLD CALC: 43.3 % (ref 37–54)
HEMOGLOBIN: 15 G/DL (ref 12.5–16.5)
IMMATURE GRANULOCYTES #: 0.03 E9/L
IMMATURE GRANULOCYTES %: 0.7 % (ref 0–5)
LACTIC ACID: 1 MMOL/L (ref 0.5–2.2)
LYMPHOCYTES ABSOLUTE: 0.83 E9/L (ref 1.5–4)
LYMPHOCYTES RELATIVE PERCENT: 20.1 % (ref 20–42)
MCH RBC QN AUTO: 31.3 PG (ref 26–35)
MCHC RBC AUTO-ENTMCNC: 34.6 % (ref 32–34.5)
MCV RBC AUTO: 90.4 FL (ref 80–99.9)
MONOCYTES ABSOLUTE: 0.51 E9/L (ref 0.1–0.95)
MONOCYTES RELATIVE PERCENT: 12.3 % (ref 2–12)
NEUTROPHILS ABSOLUTE: 2.68 E9/L (ref 1.8–7.3)
NEUTROPHILS RELATIVE PERCENT: 65 % (ref 43–80)
PDW BLD-RTO: 14.2 FL (ref 11.5–15)
PLATELET # BLD: 122 E9/L (ref 130–450)
PMV BLD AUTO: 8.5 FL (ref 7–12)
POTASSIUM SERPL-SCNC: 4.5 MMOL/L (ref 3.5–5)
RBC # BLD: 4.79 E12/L (ref 3.8–5.8)
SODIUM BLD-SCNC: 137 MMOL/L (ref 132–146)
TOTAL PROTEIN: 7.1 G/DL (ref 6.4–8.3)
WBC # BLD: 4.1 E9/L (ref 4.5–11.5)

## 2020-11-21 PROCEDURE — 99283 EMERGENCY DEPT VISIT LOW MDM: CPT

## 2020-11-21 PROCEDURE — 6370000000 HC RX 637 (ALT 250 FOR IP): Performed by: EMERGENCY MEDICINE

## 2020-11-21 PROCEDURE — 85025 COMPLETE CBC W/AUTO DIFF WBC: CPT

## 2020-11-21 PROCEDURE — 80053 COMPREHEN METABOLIC PANEL: CPT

## 2020-11-21 PROCEDURE — 83605 ASSAY OF LACTIC ACID: CPT

## 2020-11-21 RX ORDER — TETRACAINE HYDROCHLORIDE 5 MG/ML
1 SOLUTION OPHTHALMIC ONCE
Status: COMPLETED | OUTPATIENT
Start: 2020-11-21 | End: 2020-11-21

## 2020-11-21 RX ORDER — ACYCLOVIR 800 MG/1
800 TABLET ORAL
Qty: 50 TABLET | Refills: 0 | Status: SHIPPED | OUTPATIENT
Start: 2020-11-21 | End: 2020-12-01

## 2020-11-21 RX ORDER — ACYCLOVIR 200 MG/1
800 CAPSULE ORAL ONCE
Status: COMPLETED | OUTPATIENT
Start: 2020-11-21 | End: 2020-11-21

## 2020-11-21 RX ADMIN — TETRACAINE HYDROCHLORIDE 1 DROP: 5 SOLUTION/ DROPS OPHTHALMIC at 15:55

## 2020-11-21 RX ADMIN — FLUORESCEIN SODIUM 1 EACH: 0.6 STRIP OPHTHALMIC at 15:55

## 2020-11-21 RX ADMIN — ACYCLOVIR 800 MG: 200 CAPSULE ORAL at 15:55

## 2020-11-21 ASSESSMENT — ENCOUNTER SYMPTOMS
EYE PAIN: 0
WHEEZING: 0
COUGH: 0
VOMITING: 0
HOARSE VOICE: 0
SINUS PRESSURE: 0
SHORTNESS OF BREATH: 0
BACK PAIN: 0
THROAT SWELLING: 0
SORE THROAT: 0
DIARRHEA: 0
EYE DISCHARGE: 0
EYE REDNESS: 0
ABDOMINAL PAIN: 0
NAUSEA: 0

## 2020-11-21 NOTE — ED NOTES
Discharge instructions given. Patient verbalizes understanding. No other noted or stated problems at this time. Patient will follow up with primary care.      Kenneth Wise RN  11/21/20 3229

## 2020-11-21 NOTE — ED PROVIDER NOTES
Exam  Constitutional:       Appearance: Normal appearance. HENT:      Head: Normocephalic and atraumatic. Right Ear: Tympanic membrane, ear canal and external ear normal.      Left Ear: Tympanic membrane, ear canal and external ear normal.      Ears:      Comments: No rash noted in right ear     Nose: Nose normal.      Comments: No rash or blistering noted on nose or inside nose     Mouth/Throat:      Mouth: Mucous membranes are moist.      Pharynx: No oropharyngeal exudate or posterior oropharyngeal erythema. Eyes:      Extraocular Movements: Extraocular movements intact. Pupils: Pupils are equal, round, and reactive to light. Neck:      Musculoskeletal: Normal range of motion and neck supple. Cardiovascular:      Rate and Rhythm: Normal rate and regular rhythm. Pulses: Normal pulses. Heart sounds: Normal heart sounds. Pulmonary:      Effort: Pulmonary effort is normal.      Breath sounds: Normal breath sounds. Abdominal:      General: Abdomen is flat. Bowel sounds are normal. There is no distension. Palpations: Abdomen is soft. Tenderness: There is no abdominal tenderness. There is no guarding. Neurological:      Mental Status: He is alert. Procedures     MDM  Number of Diagnoses or Management Options  Herpes zoster without complication:   Diagnosis management comments: Patient seen and examined. Labs ordered on review patient's symptoms seem consistent with V2 distributed shingles. There is no evidence of any eye involvement on slit-lamp exam and ear shows no signs of any abnormal finding. There is no evidence of any nasal involvement as well. Patient was started on initial dose of acyclovir in the emergency department and given a prescription for acyclovir. He was felt safe for discharge recommend follow-up with primary care physician and his dental technician. He was to continue antibiotics for the teeth infection that he was recently treated for. --------------------------------------------- PAST HISTORY ---------------------------------------------  Past Medical History:  has a past medical history of Aortic valve regurgitation, Arrhythmia, Atrial fibrillation ([address] Dizziness, GERD (gastroesophageal reflux disease), Heartburn, History of stress test, Hypertension, and Lightheadedness. Past Surgical History:  has a past surgical history that includes Tonsillectomy; pacemaker placement; and other surgical history. Social History:  reports that he has never smoked. He has never used smokeless tobacco. He reports current alcohol use of about 2.0 standard drinks of alcohol per week. He reports that he does not use drugs. Family History: family history includes Cancer in his sister; Other in his father and mother; Stroke in his father. The patients home medications have been reviewed. Allergies: Patient has no known allergies.     -------------------------------------------------- RESULTS -------------------------------------------------  Labs:  Results for orders placed or performed during the hospital encounter of 11/21/20   CBC auto differential   Result Value Ref Range    WBC 4.1 (L) 4.5 - 11.5 E9/L    RBC 4.79 3.80 - 5.80 E12/L    Hemoglobin 15.0 12.5 - 16.5 g/dL    Hematocrit 43.3 37.0 - 54.0 %    MCV 90.4 80.0 - 99.9 fL    MCH 31.3 26.0 - 35.0 pg    MCHC 34.6 (H) 32.0 - 34.5 %    RDW 14.2 11.5 - 15.0 fL    Platelets 437 (L) 537 - 450 E9/L    MPV 8.5 7.0 - 12.0 fL    Neutrophils % 65.0 43.0 - 80.0 %    Immature Granulocytes % 0.7 0.0 - 5.0 %    Lymphocytes % 20.1 20.0 - 42.0 %    Monocytes % 12.3 (H) 2.0 - 12.0 %    Eosinophils % 1.7 0.0 - 6.0 %    Basophils % 0.2 0.0 - 2.0 %    Neutrophils Absolute 2.68 1.80 - 7.30 E9/L    Immature Granulocytes # 0.03 E9/L    Lymphocytes Absolute 0.83 (L) 1.50 - 4.00 E9/L    Monocytes Absolute 0.51 0.10 - 0.95 E9/L    Eosinophils Absolute 0.07 0.05 - 0.50 E9/L    Basophils Absolute 0.01 0.00 - 0.20 E9/L   Comprehensive Metabolic Panel   Result Value Ref Range    Sodium 137 132 - 146 mmol/L    Potassium 4.5 3.5 - 5.0 mmol/L    Chloride 103 98 - 107 mmol/L    CO2 29 22 - 29 mmol/L    Anion Gap 5 (L) 7 - 16 mmol/L    Glucose 114 (H) 74 - 99 mg/dL    BUN 17 8 - 23 mg/dL    CREATININE 1.2 0.7 - 1.2 mg/dL    GFR Non-African American 58 >=60 mL/min/1.73    GFR African American >60     Calcium 8.9 8.6 - 10.2 mg/dL    Total Protein 7.1 6.4 - 8.3 g/dL    Alb 3.8 3.5 - 5.2 g/dL    Total Bilirubin 0.8 0.0 - 1.2 mg/dL    Alkaline Phosphatase 77 40 - 129 U/L    ALT 12 0 - 40 U/L    AST 21 0 - 39 U/L   Lactic Acid, Plasma   Result Value Ref Range    Lactic Acid 1.0 0.5 - 2.2 mmol/L       Radiology:  No orders to display       ------------------------- NURSING NOTES AND VITALS REVIEWED ---------------------------  Date / Time Roomed:  11/21/2020  2:58 PM  ED Bed Assignment:  26/26    The nursing notes within the ED encounter and vital signs as below have been reviewed. BP (!) 209/90   Pulse 79   Temp 98 °F (36.7 °C) (Temporal)   Resp 18   Ht 6' (1.829 m)   Wt 195 lb (88.5 kg)   SpO2 96%   BMI 26.45 kg/m²   Oxygen Saturation Interpretation: Normal      ------------------------------------------ PROGRESS NOTES ------------------------------------------  I have spoken with the patient and discussed todays results, in addition to providing specific details for the plan of care and counseling regarding the diagnosis and prognosis. Their questions are answered at this time and they are agreeable with the plan. I discussed at length with them reasons for immediate return here for re evaluation. They will followup with their primary care physician by calling their office tomorrow. --------------------------------- ADDITIONAL PROVIDER NOTES ---------------------------------  At this time the patient is without objective evidence of an acute process requiring hospitalization or inpatient management.   They have remained hemodynamically stable throughout their entire ED visit and are stable for discharge with outpatient follow-up. The plan has been discussed in detail and they are aware of the specific conditions for emergent return, as well as the importance of follow-up. New Prescriptions    ACYCLOVIR (ZOVIRAX) 800 MG TABLET    Take 1 tablet by mouth 5 times daily for 10 days       Diagnosis:  1. Herpes zoster without complication        Disposition:  Patient's disposition: Discharge to home  Patient's condition is stable.          Xin Reza DO  11/21/20 1539

## 2020-11-23 ENCOUNTER — OFFICE VISIT (OUTPATIENT)
Dept: PRIMARY CARE CLINIC | Age: 80
End: 2020-11-23
Payer: MEDICARE

## 2020-11-23 VITALS
TEMPERATURE: 97.9 F | DIASTOLIC BLOOD PRESSURE: 70 MMHG | HEART RATE: 99 BPM | SYSTOLIC BLOOD PRESSURE: 136 MMHG | HEIGHT: 72 IN | WEIGHT: 196 LBS | OXYGEN SATURATION: 97 % | BODY MASS INDEX: 26.55 KG/M2

## 2020-11-23 PROCEDURE — 4040F PNEUMOC VAC/ADMIN/RCVD: CPT | Performed by: INTERNAL MEDICINE

## 2020-11-23 PROCEDURE — 1123F ACP DISCUSS/DSCN MKR DOCD: CPT | Performed by: INTERNAL MEDICINE

## 2020-11-23 PROCEDURE — G0438 PPPS, INITIAL VISIT: HCPCS | Performed by: INTERNAL MEDICINE

## 2020-11-23 PROCEDURE — 99213 OFFICE O/P EST LOW 20 MIN: CPT | Performed by: INTERNAL MEDICINE

## 2020-11-23 PROCEDURE — G8482 FLU IMMUNIZE ORDER/ADMIN: HCPCS | Performed by: INTERNAL MEDICINE

## 2020-11-23 ASSESSMENT — PATIENT HEALTH QUESTIONNAIRE - PHQ9
1. LITTLE INTEREST OR PLEASURE IN DOING THINGS: 0
SUM OF ALL RESPONSES TO PHQ QUESTIONS 1-9: 0
SUM OF ALL RESPONSES TO PHQ QUESTIONS 1-9: 0
SUM OF ALL RESPONSES TO PHQ9 QUESTIONS 1 & 2: 0
2. FEELING DOWN, DEPRESSED OR HOPELESS: 0
SUM OF ALL RESPONSES TO PHQ QUESTIONS 1-9: 0

## 2020-11-24 NOTE — PROGRESS NOTES
valve regurgitation     Arrhythmia     Atrial fibrillation (HCC)     Dizziness     GERD (gastroesophageal reflux disease)     Heartburn     History of stress test     Hypertension     Lightheadedness        Past Surgical History:   Procedure Laterality Date    OTHER SURGICAL HISTORY      Ablation Cardiac    PACEMAKER PLACEMENT      TONSILLECTOMY           Family History   Problem Relation Age of Onset    Other Mother         brain aneurysm    Stroke Father     Other Father         pacemaker    Cancer Sister        CareTeam (Including outside providers/suppliers regularly involved in providing care):   Patient Care Team:  Sang Mcgowan MD as PCP - General (Cardiology)    Wt Readings from Last 3 Encounters:   11/23/20 196 lb (88.9 kg)   11/21/20 195 lb (88.5 kg)   11/02/20 198 lb (89.8 kg)     Vitals:    11/23/20 1048 11/23/20 1106   BP: (!) 140/70 136/70   Pulse: 99    Temp: 97.9 °F (36.6 °C)    SpO2: 97%    Weight: 196 lb (88.9 kg)    Height: 6' (1.829 m)      Body mass index is 26.58 kg/m². Based upon direct observation of the patient, evaluation of cognition reveals recent and remote memory intact.     General Appearance: alert and oriented to person, place and time, well developed and well- nourished, in no acute distress  Skin: warm and dry, no rash or erythema  Head: normocephalic and atraumatic  Eyes: pupils equal, round, and reactive to light, extraocular eye movements intact, conjunctivae normal  ENT: tympanic membrane, external ear and ear canal normal bilaterally, nose without deformity, nasal mucosa and turbinates normal without polyps  Neck: supple and non-tender without mass, no thyromegaly or thyroid nodules, no cervical lymphadenopathy  Pulmonary/Chest: clear to auscultation bilaterally- no wheezes, rales or rhonchi, normal air movement, no respiratory distress  Cardiovascular: normal rate, regular rhythm, normal S1 and S2, no murmurs, rubs, clicks, or gallops, distal pulses intact, no carotid bruits  Abdomen: soft, non-tender, non-distended, normal bowel sounds, no masses or organomegaly  Extremities: no cyanosis, clubbing or edema  Musculoskeletal: normal range of motion, no joint swelling, deformity or tenderness  Neurologic: reflexes normal and symmetric, no cranial nerve deficit, gait, coordination and speech normal    Patient's complete Health Risk Assessment and screening values have been reviewed and are found in Flowsheets. The following problems were reviewed today and where indicated follow up appointments were made and/or referrals ordered. Positive Risk Factor Screenings with Interventions:       General Health and ACP:  General  In general, how would you say your health is?: Very Good  In the past 7 days, have you experienced any of the following? New or Increased Pain, New or Increased Fatigue, Loneliness, Social Isolation, Stress or Anger?: (!) Stress  Do you get the social and emotional support that you need?: Yes  Do you have a Living Will?: Yes  Advance Directives     Power of  Living Will ACP-Advance Directive ACP-Power of     Not on File [place] on 06/06/12 Filed [address] [place] [address] [place] Risk Interventions:  · . Health Habits/Nutrition:  Health Habits/Nutrition  Do you exercise for at least 20 minutes 2-3 times per week?: (!) No  Have you lost any weight without trying in the past 3 months?: No  Do you eat fewer than 2 meals per day?: No  Have you seen a dentist within the past year?: Yes  Body mass index: (!) 26.58  Health Habits/Nutrition Interventions:  · Patient encouraged to try to maintain a heart healthy diet and exercise as tolerated.     Hearing/Vision:  No exam data present  Hearing/Vision  Do you or your family notice any trouble with your hearing?: (!) Yes  Do you have difficulty driving, watching TV, or doing any of your daily activities because of your eyesight?: No  Have you had an eye exam within the past year?: Yes  Hearing/Vision Interventions:  · Patient advised to get an annual eye exam.    Personalized Preventive Plan   Current Health Maintenance Status  Immunization History   Administered Date(s) Administered    Influenza, High Dose (Fluzone 65 yrs and older) 09/25/2020    Pneumococcal Polysaccharide ([id number]) 09/25/2020    Td, unspecified formulation 06/05/2012        Health Maintenance   Topic Date Due    DTaP/Tdap/Td vaccine (1 - Tdap) 07/25/1958    Shingles Vaccine (1 of 2) 07/25/1989    PSA counseling  06/06/2019    Annual Wellness Visit (AWV)  06/23/2019    Potassium monitoring  11/21/2021    Creatinine monitoring  11/21/2021    Flu vaccine  Completed    Pneumococcal 65+ years Vaccine  Completed    Hepatitis A vaccine  Aged Out    Hepatitis B vaccine  Aged Out    Hib vaccine  Aged Out    Meningococcal (ACWY) vaccine  Aged Out     Recommendations for [employer] Due: see orders and patient instructions/AVS.  . Recommended screening schedule for the next 5-10 years is provided to the patient in written form: see Patient Instructions/AVS.    Ambrose Perdomo was seen today for medicare awv. Diagnoses and all orders for this visit:    Routine general medical examination at a health care facility    Herpes zoster virus infection of face and ear nerves, right side of face and soft palate, with pain behind the right ear. Discussion: Patient will finish course of acyclovir and he is advised that he should expect gradual improvement of his symptoms. He will call if he feels he needs anything stronger for pain.   He will continue all of his usual meds and supplements the same as listed on his med list.

## 2020-11-24 NOTE — PATIENT INSTRUCTIONS
Personalized Preventive Plan for [facility] - 11/23/2020  Medicare offers a range of preventive health benefits. Some of the tests and screenings are paid in full while other may be subject to a deductible, co-insurance, and/or copay. Some of these benefits include a comprehensive review of your medical history including lifestyle, illnesses that may run in your family, and various assessments and screenings as appropriate. After reviewing your medical record and screening and assessments performed today your provider may have ordered immunizations, labs, imaging, and/or referrals for you. A list of these orders (if applicable) as well as your Preventive Care list are included within your After Visit Summary for your review. Other Preventive Recommendations:    · A preventive eye exam performed by an eye specialist is recommended every 1-2 years to screen for glaucoma; cataracts, macular degeneration, and other eye disorders. · A preventive dental visit is recommended every 6 months. · Try to get at least 150 minutes of exercise per week or 10,000 steps per day on a pedometer . · Order or download the FREE \"Exercise & Physical Activity: Your Everyday Guide\" from [employer]. Call 0-535.638.4900 or search [employer] online. · You need 3652-3855 mg of calcium and 2617-8825 IU of vitamin D per day. It is possible to meet your calcium requirement with diet alone, but a vitamin D supplement is usually necessary to meet this goal.  · When exposed to the sun, use a sunscreen that protects against both UVA and UVB radiation with an SPF of 30 or greater. Reapply every 2 to 3 hours or after sweating, drying off with a towel, or swimming. · Always wear a seat belt when traveling in a car. Always wear a helmet when riding a bicycle or motorcycle.

## 2020-11-30 RX ORDER — OMEPRAZOLE 40 MG/1
40 CAPSULE, DELAYED RELEASE ORAL DAILY
Qty: 90 CAPSULE | Refills: 3 | Status: SHIPPED
Start: 2020-11-30 | End: 2020-12-01 | Stop reason: SDUPTHER

## 2020-12-01 RX ORDER — OMEPRAZOLE 40 MG/1
40 CAPSULE, DELAYED RELEASE ORAL DAILY
Qty: 90 CAPSULE | Refills: 3 | Status: SHIPPED
Start: 2020-12-01 | End: 2022-01-10 | Stop reason: SDUPTHER

## 2021-01-20 ENCOUNTER — OFFICE VISIT (OUTPATIENT)
Dept: PRIMARY CARE CLINIC | Age: 81
End: 2021-01-20
Payer: MEDICARE

## 2021-01-20 VITALS
BODY MASS INDEX: 27.36 KG/M2 | DIASTOLIC BLOOD PRESSURE: 64 MMHG | SYSTOLIC BLOOD PRESSURE: 130 MMHG | OXYGEN SATURATION: 97 % | WEIGHT: 202 LBS | HEIGHT: 72 IN | TEMPERATURE: 97.3 F | HEART RATE: 83 BPM

## 2021-01-20 DIAGNOSIS — R73.03 PREDIABETES: ICD-10-CM

## 2021-01-20 DIAGNOSIS — Z86.79 HISTORY OF ATRIAL FIBRILLATION: ICD-10-CM

## 2021-01-20 DIAGNOSIS — E78.1 PURE HYPERTRIGLYCERIDEMIA: ICD-10-CM

## 2021-01-20 DIAGNOSIS — R39.11 BENIGN PROSTATIC HYPERPLASIA WITH URINARY HESITANCY: ICD-10-CM

## 2021-01-20 DIAGNOSIS — N40.1 BENIGN PROSTATIC HYPERPLASIA WITH URINARY HESITANCY: ICD-10-CM

## 2021-01-20 DIAGNOSIS — I10 ESSENTIAL HYPERTENSION: Primary | ICD-10-CM

## 2021-01-20 DIAGNOSIS — Z79.01 ANTICOAGULATED: ICD-10-CM

## 2021-01-20 PROBLEM — R10.10 UPPER ABDOMINAL PAIN: Status: RESOLVED | Noted: 2020-07-21 | Resolved: 2021-01-20

## 2021-01-20 LAB
BILIRUBIN, POC: NORMAL
BLOOD URINE, POC: NORMAL
CLARITY, POC: CLEAR
COLOR, POC: YELLOW
GLUCOSE URINE, POC: NORMAL
KETONES, POC: NORMAL
LEUKOCYTE EST, POC: NORMAL
NITRITE, POC: NORMAL
PH, POC: 6
PROTEIN, POC: NORMAL
SPECIFIC GRAVITY, POC: 1.03
UROBILINOGEN, POC: 1

## 2021-01-20 PROCEDURE — 81002 URINALYSIS NONAUTO W/O SCOPE: CPT | Performed by: INTERNAL MEDICINE

## 2021-01-20 PROCEDURE — G8482 FLU IMMUNIZE ORDER/ADMIN: HCPCS | Performed by: INTERNAL MEDICINE

## 2021-01-20 PROCEDURE — 99215 OFFICE O/P EST HI 40 MIN: CPT | Performed by: INTERNAL MEDICINE

## 2021-01-20 PROCEDURE — 1036F TOBACCO NON-USER: CPT | Performed by: INTERNAL MEDICINE

## 2021-01-20 PROCEDURE — 4040F PNEUMOC VAC/ADMIN/RCVD: CPT | Performed by: INTERNAL MEDICINE

## 2021-01-20 PROCEDURE — 1123F ACP DISCUSS/DSCN MKR DOCD: CPT | Performed by: INTERNAL MEDICINE

## 2021-01-20 PROCEDURE — G8427 DOCREV CUR MEDS BY ELIG CLIN: HCPCS | Performed by: INTERNAL MEDICINE

## 2021-01-20 PROCEDURE — G8417 CALC BMI ABV UP PARAM F/U: HCPCS | Performed by: INTERNAL MEDICINE

## 2021-01-20 ASSESSMENT — PATIENT HEALTH QUESTIONNAIRE - PHQ9
SUM OF ALL RESPONSES TO PHQ9 QUESTIONS 1 & 2: 0
2. FEELING DOWN, DEPRESSED OR HOPELESS: 0
SUM OF ALL RESPONSES TO PHQ QUESTIONS 1-9: 0

## 2021-01-20 NOTE — PROGRESS NOTES
Gillian Clink  1/20/21     Chief Complaint   Patient presents with    Hypertension     physical         No Known Allergies     Current Outpatient Medications   Medication Sig Dispense Refill    omeprazole (PRILOSEC) 40 MG delayed release capsule Take 1 capsule by mouth daily 90 capsule 3    metoprolol succinate (TOPROL XL) 50 MG extended release tablet TAKE 1 TABLET BY MOUTH ONCE DAILY      losartan (COZAAR) 50 MG tablet Take 50 mg by mouth daily      budesonide-formoterol (SYMBICORT) 80-4.5 MCG/ACT AERO Inhale 2 puffs into the lungs 2 times daily      albuterol sulfate HFA (VENTOLIN HFA) 108 (90 Base) MCG/ACT inhaler Inhale 2 puffs into the lungs every 4 hours as needed for Wheezing      apixaban (ELIQUIS) 5 MG TABS tablet Take 1 tablet by mouth 2 times daily 60 tablet 5     No current facility-administered medications for this visit. HPI: Patient comes in for his annual physical. He is now 79years of age. Currently feels well. His asthma has been stable. His herpes zoster right side of his face has completely resolved with only mild tingling discomfort at times. He denies any chest pain or shortness of breath. He follows up with his urologist for his history of BPH and elevated PSA, which has been stable. He has nocturia x1 or 2. He remains on his usual meds and supplements the same as listed on his med list. He remains anticoagulated with Eliquis due to his history of A. fib. He follows up with his cardiologist periodically. He recently saw his orthopedic surgeon in [place] to [facility]. for follow-up of his right total hip arthroplasty which is doing very well. He is scheduled for his first COVID-19 vaccine in 2 days.     Review of Systems    General:   no weight change, no malaise, no fatigue, no change in appetite, no sleep disturbance, no fever/chills, no night sweats,  Skin:                no abnormal pigmentation, no rash, no scaling, no itching, no masses, no hair or nail changes Eyes:               no blurring, no diplopia, no eye pain, no glaucoma, no cataracts  ENT:                 no hearing loss, no tinnitus, no vertigo, no nosebleed, no nasal congestion, no rhinorrhea, no sore throat, no jaw pain, no hoarseness,  no bleeding    Neck:     no node tenderness , not rigid, no masses   Respiratory:           no cough, no sputum, no coughing blood, no pleuritic , no chest pain, no dyspnea,  no wheezing  Cardiovascular:         no angina, no chest pain  No syncope, no pedal edema , no orthopnea, no PND, no palpitations, no claudication  Gastrointestinal  no nausea, no vomiting, no heartburn, no diarrhea, no constipation, no bloating, no abdominal pain, no rectal pain, no bleeding no hemorrhoids, no hernia. Genitourinary:            no urinary urgency, no frequency, no dysuria, no nocturia, no hesitancy, no  incontinence, no bleeding, no stones  Musculoskeletal:        no arthritis, no  arthralgia, no myalgia, no  weakness,  no morning stiffness, no joint swelling   Neurologic:                 no paralysis, no paresis, no  paresthesia, no seizures, no tremors, no headaches, no tumors , no stroke, no speech issues,  No incoordination, no head trauma, no memory loss/concentration  Hematologic:              no anemia, no abnormal bleeding / bruising, no fever, no chills, no night sweats, no wollen glands, no unexplained weight loss  Endocrine:        no heat or cold intolerance and no polyphagia, polydipsia,  or polyuria  Psych:  He denies any anxiety or depression. Physical Exam:  Patient is an 79 y.o. male     Constitutional:  General Appearance: Well-appearing. Level of Distress: NAD. Ambulation: ambulating normally    Psychiatric:  Insight: good judgment: Mental status: normal mood and affect. Orientation: oriented to time place and person. Memory: recent memory normal and remote memory normal.     Head: normocephalic and atraumatic. Eyes:  Lids and Conjunctiva: Non-injected and no pallor. Pupils: PERRLA, Corneas: grossly intact. EOMI, Lens: clear. Sclerae: non-icteric. Vision: peripheral vision grossly intact and acuity grossly intact. ENMT:  Ears: no lesions on external ear. TMs clear and TM mobility normal.  Hearing: no hearing loss. Nose: No lesions on external nose, septal deviation sinus tenderness or nasal discharge and nares patent and nasal passages clear. Lips, Teeth and Gums:  no mouth or lip ulcers or bleeding gums and normal dentition. Oropharynx: no erythema or exudates and moist mucous membranes and tonsils not enlarged. Neck:  Neck supple, FROM, trachea midline, and no masses. Lymph nodes: no cervical LAD, supraclavicular LAD, axillary LAD, or inguinal LAD. Thyroid: non-tender and no enlargement. Lungs:  Respiratory effort, no dyspnea. Auscultation: no rales/crackles or rhonchi and breath sounds normal, good air movement and CTA except as noted. Cardiovascular: Apical impulse:not displaced. Heart: Auscultation: normal S1 and S2, no murmurs, rubs or gallops; and RRR. Neck vessels: no carotid bruits. Pulses including femoral/pedal: normal throughout. Abdomen: Bowel sounds: normal.  Inspection and Palpation: no tenderness, guarding, masses, rebound tenderness or CVA tenderness and soft and non-distended. Liver: non-tender and no hepatomegaly. Spleen: non-tender and no splenomegaly. Hernia: none palpable. Musculoskeletal: Motor Strength and Tone: normal tone and motor strength. Joints, Bones and Muscles: no malalignment, tenderness or bony abnormalities and normal movement of all extremities. Extremities: no cyanosis, edema, varicosities, or palpable cord. Neurologic:  Gait and Station: normal gait and station. Cranial nerves:grossly intact. Sensation:grossly intact and monofilament test intact. Reflexes: DTRs 2+ bilaterally throughout. Coordination and Cerebellum: finger to nose intact and no tremor. Skin: Inspection and palpation: no rash, lesions, ulcer, induration, nodules, jaundice or abnormal nevi and good turgor. Nails: normal.     Back:  Thoracolumbar Appearance: normal curvature. I have examined the patient's feet and found no evidence of deformities, calluses, ulcerations, or evidence of neuropathy. Lab Results   Component Value Date    WBC 4.1 (L) 11/21/2020    HGB 15.0 11/21/2020    HCT 43.3 11/21/2020     (L) 11/21/2020    CHOL 152 06/06/2018    TRIG 166 (H) 06/06/2018    HDL 31 06/06/2018    ALT 12 11/21/2020    AST 21 11/21/2020    TSH 1.250 06/06/2018    PSA 7.02 (H) 06/06/2018      Lab Results   Component Value Date     11/21/2020    K 4.5 11/21/2020     11/21/2020    CO2 29 11/21/2020    BUN 17 11/21/2020    CREATININE 1.2 11/21/2020    GLUCOSE 114 (H) 11/21/2020    CALCIUM 8.9 11/21/2020    PROT 7.1 11/21/2020    LABALBU 3.8 11/21/2020    BILITOT 0.8 11/21/2020    ALKPHOS 77 11/21/2020    AST 21 11/21/2020    ALT 12 11/21/2020    LABGLOM 58 11/21/2020    GFRAA >60 11/21/2020            Assessment:    Essential hypertension, controlled on current meds. -     POCT Urinalysis no Micro    Benign prostatic hyperplasia with history of elevated PSA and with urinary hesitancy and nocturia, stable and followed by his urologist    History of atrial fibrillation, currently maintained in sinus rhythm by exam, and followed by his cardiologist.    Anticoagulated on Eliquis with no bleeding issues.

## 2021-01-22 ENCOUNTER — IMMUNIZATION (OUTPATIENT)
Dept: PRIMARY CARE CLINIC | Age: 81
End: 2021-01-22
Payer: MEDICARE

## 2021-01-22 DIAGNOSIS — Z23 NEED FOR VACCINATION: Primary | ICD-10-CM

## 2021-01-22 PROCEDURE — 0001A COVID-19, PFIZER VACCINE 30MCG/0.3ML DOSE: CPT | Performed by: PHYSICIAN ASSISTANT

## 2021-01-22 PROCEDURE — 91300 COVID-19, PFIZER VACCINE 30MCG/0.3ML DOSE: CPT | Performed by: PHYSICIAN ASSISTANT

## 2021-01-26 DIAGNOSIS — I10 ESSENTIAL HYPERTENSION: ICD-10-CM

## 2021-01-26 LAB
ALBUMIN SERPL-MCNC: 3.9 G/DL (ref 3.5–5.2)
ALP BLD-CCNC: 58 U/L (ref 40–129)
ALT SERPL-CCNC: 19 U/L (ref 0–40)
ANION GAP SERPL CALCULATED.3IONS-SCNC: 12 MMOL/L (ref 7–16)
AST SERPL-CCNC: 19 U/L (ref 0–39)
BILIRUB SERPL-MCNC: 0.5 MG/DL (ref 0–1.2)
BUN BLDV-MCNC: 15 MG/DL (ref 8–23)
CALCIUM SERPL-MCNC: 9 MG/DL (ref 8.6–10.2)
CHLORIDE BLD-SCNC: 108 MMOL/L (ref 98–107)
CHOLESTEROL, TOTAL: 139 MG/DL (ref 0–199)
CO2: 26 MMOL/L (ref 22–29)
CREAT SERPL-MCNC: 1.2 MG/DL (ref 0.7–1.2)
GFR AFRICAN AMERICAN: >60
GFR NON-AFRICAN AMERICAN: 58 ML/MIN/1.73
GLUCOSE BLD-MCNC: 124 MG/DL (ref 74–99)
HCT VFR BLD CALC: 45.8 % (ref 37–54)
HDLC SERPL-MCNC: 37 MG/DL
HEMOGLOBIN: 15 G/DL (ref 12.5–16.5)
LDL CHOLESTEROL CALCULATED: 74 MG/DL (ref 0–99)
MCH RBC QN AUTO: 31.6 PG (ref 26–35)
MCHC RBC AUTO-ENTMCNC: 32.8 % (ref 32–34.5)
MCV RBC AUTO: 96.4 FL (ref 80–99.9)
PDW BLD-RTO: 15.2 FL (ref 11.5–15)
PLATELET # BLD: 152 E9/L (ref 130–450)
PMV BLD AUTO: 9.2 FL (ref 7–12)
POTASSIUM SERPL-SCNC: 4.7 MMOL/L (ref 3.5–5)
RBC # BLD: 4.75 E12/L (ref 3.8–5.8)
SODIUM BLD-SCNC: 146 MMOL/L (ref 132–146)
TOTAL PROTEIN: 6.6 G/DL (ref 6.4–8.3)
TRIGL SERPL-MCNC: 140 MG/DL (ref 0–149)
TSH SERPL DL<=0.05 MIU/L-ACNC: 0.81 UIU/ML (ref 0.27–4.2)
VLDLC SERPL CALC-MCNC: 28 MG/DL
WBC # BLD: 6.7 E9/L (ref 4.5–11.5)

## 2021-01-27 ENCOUNTER — NURSE ONLY (OUTPATIENT)
Dept: PRIMARY CARE CLINIC | Age: 81
End: 2021-01-27
Payer: MEDICARE

## 2021-01-27 DIAGNOSIS — R73.9 BLOOD GLUCOSE ELEVATED: Primary | ICD-10-CM

## 2021-01-27 LAB — HBA1C MFR BLD: 5 %

## 2021-01-27 PROCEDURE — 83036 HEMOGLOBIN GLYCOSYLATED A1C: CPT | Performed by: INTERNAL MEDICINE

## 2021-02-12 ENCOUNTER — IMMUNIZATION (OUTPATIENT)
Dept: PRIMARY CARE CLINIC | Age: 81
End: 2021-02-12
Payer: MEDICARE

## 2021-02-12 PROCEDURE — 91300 COVID-19, PFIZER VACCINE 30MCG/0.3ML DOSE: CPT | Performed by: PHYSICIAN ASSISTANT

## 2021-02-12 PROCEDURE — 0002A COVID-19, PFIZER VACCINE 30MCG/0.3ML DOSE: CPT | Performed by: PHYSICIAN ASSISTANT

## 2021-05-06 ENCOUNTER — OFFICE VISIT (OUTPATIENT)
Dept: PRIMARY CARE CLINIC | Age: 81
End: 2021-05-06
Payer: MEDICARE

## 2021-05-06 VITALS
HEIGHT: 72 IN | HEART RATE: 106 BPM | TEMPERATURE: 97 F | BODY MASS INDEX: 28.17 KG/M2 | OXYGEN SATURATION: 92 % | WEIGHT: 208 LBS | DIASTOLIC BLOOD PRESSURE: 76 MMHG | SYSTOLIC BLOOD PRESSURE: 140 MMHG | RESPIRATION RATE: 18 BRPM

## 2021-05-06 DIAGNOSIS — Z79.01 ANTICOAGULATED: ICD-10-CM

## 2021-05-06 DIAGNOSIS — R60.0 EDEMA OF BOTH LOWER LEGS: Primary | ICD-10-CM

## 2021-05-06 DIAGNOSIS — I10 ESSENTIAL HYPERTENSION: ICD-10-CM

## 2021-05-06 PROCEDURE — G8417 CALC BMI ABV UP PARAM F/U: HCPCS | Performed by: INTERNAL MEDICINE

## 2021-05-06 PROCEDURE — 1036F TOBACCO NON-USER: CPT | Performed by: INTERNAL MEDICINE

## 2021-05-06 PROCEDURE — 1123F ACP DISCUSS/DSCN MKR DOCD: CPT | Performed by: INTERNAL MEDICINE

## 2021-05-06 PROCEDURE — 4040F PNEUMOC VAC/ADMIN/RCVD: CPT | Performed by: INTERNAL MEDICINE

## 2021-05-06 PROCEDURE — G8427 DOCREV CUR MEDS BY ELIG CLIN: HCPCS | Performed by: INTERNAL MEDICINE

## 2021-05-06 PROCEDURE — 99213 OFFICE O/P EST LOW 20 MIN: CPT | Performed by: INTERNAL MEDICINE

## 2021-05-06 RX ORDER — HYDROCHLOROTHIAZIDE 12.5 MG/1
12.5 CAPSULE, GELATIN COATED ORAL DAILY PRN
Qty: 30 CAPSULE | Refills: 1 | Status: SHIPPED
Start: 2021-05-06 | End: 2021-06-02

## 2021-05-06 NOTE — PROGRESS NOTES
Erin Kolb  5/6/21     Chief Complaint   Patient presents with    Foot Swelling        No Known Allergies     Current Outpatient Medications   Medication Sig Dispense Refill    hydroCHLOROthiazide (MICROZIDE) 12.5 MG capsule Take 1 capsule by mouth daily as needed (edema of legs) 30 capsule 1    omeprazole (PRILOSEC) 40 MG delayed release capsule Take 1 capsule by mouth daily 90 capsule 3    metoprolol succinate (TOPROL XL) 50 MG extended release tablet TAKE 1 TABLET BY MOUTH ONCE DAILY      losartan (COZAAR) 50 MG tablet Take 50 mg by mouth daily      budesonide-formoterol (SYMBICORT) 80-4.5 MCG/ACT AERO Inhale 2 puffs into the lungs 2 times daily      albuterol sulfate HFA (VENTOLIN HFA) 108 (90 Base) MCG/ACT inhaler Inhale 2 puffs into the lungs every 4 hours as needed for Wheezing      apixaban (ELIQUIS) 5 MG TABS tablet Take 1 tablet by mouth 2 times daily 60 tablet 5     No current facility-administered medications for this visit. HPI: Patient comes in today complaining of swelling of his lower legs and feet for the past few days. He just returned from a 5-day golf trip to [facility] yesterday. He was eating at restaurants and not watching his sodium intake and drinking a moderate amount of alcohol. He denies any pain in either leg. He denies any chest pain or shortness of breath. He remains on all of his usual meds and supplements the same as listed on his med list, including anticoagulation with Eliquis. He has not had any bleeding issues. He follows up with his urologist for management of his BPH and mildly elevated PSA. Review of Systems: as per HPI      Physical Exam:    Patient is a 79 y.o. male. Patient appears to be in no distress. Breathing comfortably. Ambulates without assistance. HEENT: normal.  Neck supple, no adenopathy or bruits. Heart RR, no MGR. Lungs clear. Abd: normal  Ext: 2+ pitting edema both lower legs and feet. No calf tenderness.   Peripheral pulses: normal.  No neurologic deficits noted. Lab Results   Component Value Date    WBC 6.7 01/26/2021    HGB 15.0 01/26/2021    HCT 45.8 01/26/2021     01/26/2021    CHOL 139 01/26/2021    TRIG 140 01/26/2021    HDL 37 01/26/2021    ALT 19 01/26/2021    AST 19 01/26/2021    TSH 0.805 01/26/2021    PSA 7.02 (H) 06/06/2018    LABA1C 5.0 01/27/2021      Lab Results   Component Value Date     01/26/2021    K 4.7 01/26/2021     (H) 01/26/2021    CO2 26 01/26/2021    BUN 15 01/26/2021    CREATININE 1.2 01/26/2021    GLUCOSE 124 (H) 01/26/2021    CALCIUM 9.0 01/26/2021    PROT 6.6 01/26/2021    LABALBU 3.9 01/26/2021    BILITOT 0.5 01/26/2021    ALKPHOS 58 01/26/2021    AST 19 01/26/2021    ALT 19 01/26/2021    LABGLOM 58 01/26/2021    GFRAA >60 01/26/2021            Assessment:    Jeb Wadsworth was seen today for foot swelling. Diagnoses and all orders for this visit:    Edema of both lower legs probably due to combination of increased sodium intake and travel. -     hydroCHLOROthiazide (MICROZIDE) 12.5 MG capsule; Take 1 capsule by mouth daily as needed (edema of legs)    Essential hypertension, borderline control. Anticoagulated on Eliquis with no reported bleeding issues. Discussion Notes: He will continue all his current meds and supplements the same as listed on his med list.  He is advised to try to follow a low-sodium diet. We will add HCTZ 12.5 mg daily and he will call in a week if his edema is not improved. Otherwise he is due to return in approximately 2 months for his routine follow-up visit.

## 2021-05-09 ENCOUNTER — APPOINTMENT (OUTPATIENT)
Dept: CT IMAGING | Age: 81
End: 2021-05-09
Payer: MEDICARE

## 2021-05-09 ENCOUNTER — HOSPITAL ENCOUNTER (OUTPATIENT)
Age: 81
Setting detail: OBSERVATION
Discharge: HOME | End: 2021-05-11
Attending: EMERGENCY MEDICINE | Admitting: FAMILY MEDICINE
Payer: MEDICARE

## 2021-05-09 ENCOUNTER — APPOINTMENT (OUTPATIENT)
Dept: GENERAL RADIOLOGY | Age: 81
End: 2021-05-09
Payer: MEDICARE

## 2021-05-09 DIAGNOSIS — R41.82 ALTERED MENTAL STATUS, UNSPECIFIED ALTERED MENTAL STATUS TYPE: Primary | ICD-10-CM

## 2021-05-09 LAB
ALBUMIN SERPL-MCNC: 3.8 G/DL (ref 3.5–5.2)
ALP BLD-CCNC: 58 U/L (ref 40–129)
ALT SERPL-CCNC: 21 U/L (ref 0–40)
ANION GAP SERPL CALCULATED.3IONS-SCNC: 12 MMOL/L (ref 7–16)
AST SERPL-CCNC: 30 U/L (ref 0–39)
BACTERIA: ABNORMAL /HPF
BASOPHILS ABSOLUTE: 0.02 E9/L (ref 0–0.2)
BASOPHILS RELATIVE PERCENT: 0.2 % (ref 0–2)
BILIRUB SERPL-MCNC: 0.9 MG/DL (ref 0–1.2)
BILIRUBIN URINE: NEGATIVE
BLOOD, URINE: ABNORMAL
BUN BLDV-MCNC: 14 MG/DL (ref 6–23)
CALCIUM SERPL-MCNC: 8.9 MG/DL (ref 8.6–10.2)
CHLORIDE BLD-SCNC: 99 MMOL/L (ref 98–107)
CLARITY: CLEAR
CO2: 25 MMOL/L (ref 22–29)
COLOR: YELLOW
CREAT SERPL-MCNC: 1.2 MG/DL (ref 0.7–1.2)
EOSINOPHILS ABSOLUTE: 0 E9/L (ref 0.05–0.5)
EOSINOPHILS RELATIVE PERCENT: 0 % (ref 0–6)
GFR AFRICAN AMERICAN: >60
GFR NON-AFRICAN AMERICAN: 58 ML/MIN/1.73
GLUCOSE BLD-MCNC: 130 MG/DL (ref 74–99)
GLUCOSE URINE: NEGATIVE MG/DL
HCT VFR BLD CALC: 43.6 % (ref 37–54)
HEMOGLOBIN: 15.3 G/DL (ref 12.5–16.5)
IMMATURE GRANULOCYTES #: 0.06 E9/L
IMMATURE GRANULOCYTES %: 0.5 % (ref 0–5)
INR BLD: 1.3
KETONES, URINE: 15 MG/DL
LEUKOCYTE ESTERASE, URINE: NEGATIVE
LYMPHOCYTES ABSOLUTE: 0.9 E9/L (ref 1.5–4)
LYMPHOCYTES RELATIVE PERCENT: 7.8 % (ref 20–42)
MAGNESIUM: 2 MG/DL (ref 1.6–2.6)
MCH RBC QN AUTO: 32.1 PG (ref 26–35)
MCHC RBC AUTO-ENTMCNC: 35.1 % (ref 32–34.5)
MCV RBC AUTO: 91.6 FL (ref 80–99.9)
MONOCYTES ABSOLUTE: 0.67 E9/L (ref 0.1–0.95)
MONOCYTES RELATIVE PERCENT: 5.8 % (ref 2–12)
MUCUS: PRESENT /LPF
NEUTROPHILS ABSOLUTE: 9.85 E9/L (ref 1.8–7.3)
NEUTROPHILS RELATIVE PERCENT: 85.7 % (ref 43–80)
NITRITE, URINE: NEGATIVE
PDW BLD-RTO: 14 FL (ref 11.5–15)
PH UA: 6.5 (ref 5–9)
PLATELET # BLD: 202 E9/L (ref 130–450)
PMV BLD AUTO: 8.7 FL (ref 7–12)
POTASSIUM SERPL-SCNC: 4 MMOL/L (ref 3.5–5)
PRO-BNP: 519 PG/ML (ref 0–450)
PROTEIN UA: 30 MG/DL
PROTHROMBIN TIME: 15 SEC (ref 9.3–12.4)
RBC # BLD: 4.76 E12/L (ref 3.8–5.8)
RBC UA: ABNORMAL /HPF (ref 0–2)
SODIUM BLD-SCNC: 136 MMOL/L (ref 132–146)
SPECIFIC GRAVITY UA: 1.02 (ref 1–1.03)
TOTAL PROTEIN: 6.9 G/DL (ref 6.4–8.3)
TROPONIN: <0.01 NG/ML (ref 0–0.03)
TROPONIN: <0.01 NG/ML (ref 0–0.03)
UROBILINOGEN, URINE: 1 E.U./DL
WBC # BLD: 11.5 E9/L (ref 4.5–11.5)
WBC UA: ABNORMAL /HPF (ref 0–5)

## 2021-05-09 PROCEDURE — 6370000000 HC RX 637 (ALT 250 FOR IP): Performed by: FAMILY MEDICINE

## 2021-05-09 PROCEDURE — 81001 URINALYSIS AUTO W/SCOPE: CPT

## 2021-05-09 PROCEDURE — 2580000003 HC RX 258: Performed by: STUDENT IN AN ORGANIZED HEALTH CARE EDUCATION/TRAINING PROGRAM

## 2021-05-09 PROCEDURE — 71046 X-RAY EXAM CHEST 2 VIEWS: CPT

## 2021-05-09 PROCEDURE — 93005 ELECTROCARDIOGRAM TRACING: CPT | Performed by: NURSE PRACTITIONER

## 2021-05-09 PROCEDURE — 80053 COMPREHEN METABOLIC PANEL: CPT

## 2021-05-09 PROCEDURE — 84146 ASSAY OF PROLACTIN: CPT

## 2021-05-09 PROCEDURE — 84484 ASSAY OF TROPONIN QUANT: CPT

## 2021-05-09 PROCEDURE — 85610 PROTHROMBIN TIME: CPT

## 2021-05-09 PROCEDURE — 70450 CT HEAD/BRAIN W/O DYE: CPT

## 2021-05-09 PROCEDURE — 83735 ASSAY OF MAGNESIUM: CPT

## 2021-05-09 PROCEDURE — 2580000003 HC RX 258: Performed by: FAMILY MEDICINE

## 2021-05-09 PROCEDURE — 36415 COLL VENOUS BLD VENIPUNCTURE: CPT

## 2021-05-09 PROCEDURE — G0378 HOSPITAL OBSERVATION PER HR: HCPCS

## 2021-05-09 PROCEDURE — 83880 ASSAY OF NATRIURETIC PEPTIDE: CPT

## 2021-05-09 PROCEDURE — 85025 COMPLETE CBC W/AUTO DIFF WBC: CPT

## 2021-05-09 PROCEDURE — 82550 ASSAY OF CK (CPK): CPT

## 2021-05-09 PROCEDURE — 99284 EMERGENCY DEPT VISIT MOD MDM: CPT

## 2021-05-09 RX ORDER — LOSARTAN POTASSIUM 25 MG/1
50 TABLET ORAL DAILY
Status: DISCONTINUED | OUTPATIENT
Start: 2021-05-10 | End: 2021-05-11 | Stop reason: HOSPADM

## 2021-05-09 RX ORDER — LOSARTAN POTASSIUM 25 MG/1
50 TABLET ORAL DAILY
Status: DISCONTINUED | OUTPATIENT
Start: 2021-05-09 | End: 2021-05-09

## 2021-05-09 RX ORDER — PROMETHAZINE HYDROCHLORIDE 25 MG/1
12.5 TABLET ORAL EVERY 6 HOURS PRN
Status: DISCONTINUED | OUTPATIENT
Start: 2021-05-09 | End: 2021-05-11 | Stop reason: HOSPADM

## 2021-05-09 RX ORDER — POLYETHYLENE GLYCOL 3350 17 G/17G
17 POWDER, FOR SOLUTION ORAL DAILY PRN
Status: DISCONTINUED | OUTPATIENT
Start: 2021-05-09 | End: 2021-05-11 | Stop reason: HOSPADM

## 2021-05-09 RX ORDER — LORAZEPAM 2 MG/ML
0.5 INJECTION INTRAMUSCULAR EVERY 4 HOURS PRN
Status: DISCONTINUED | OUTPATIENT
Start: 2021-05-09 | End: 2021-05-11 | Stop reason: HOSPADM

## 2021-05-09 RX ORDER — METOPROLOL SUCCINATE 50 MG/1
50 TABLET, EXTENDED RELEASE ORAL DAILY
Status: DISCONTINUED | OUTPATIENT
Start: 2021-05-09 | End: 2021-05-09

## 2021-05-09 RX ORDER — METOPROLOL SUCCINATE 25 MG/1
50 TABLET, EXTENDED RELEASE ORAL DAILY
Status: DISCONTINUED | OUTPATIENT
Start: 2021-05-10 | End: 2021-05-11 | Stop reason: HOSPADM

## 2021-05-09 RX ORDER — ONDANSETRON 2 MG/ML
4 INJECTION INTRAMUSCULAR; INTRAVENOUS EVERY 6 HOURS PRN
Status: DISCONTINUED | OUTPATIENT
Start: 2021-05-09 | End: 2021-05-11 | Stop reason: HOSPADM

## 2021-05-09 RX ORDER — ASPIRIN 300 MG/1
300 SUPPOSITORY RECTAL DAILY
Status: DISCONTINUED | OUTPATIENT
Start: 2021-05-10 | End: 2021-05-11 | Stop reason: HOSPADM

## 2021-05-09 RX ORDER — SODIUM CHLORIDE 0.9 % (FLUSH) 0.9 %
5-40 SYRINGE (ML) INJECTION EVERY 12 HOURS SCHEDULED
Status: DISCONTINUED | OUTPATIENT
Start: 2021-05-09 | End: 2021-05-11 | Stop reason: HOSPADM

## 2021-05-09 RX ORDER — SODIUM CHLORIDE 9 MG/ML
25 INJECTION, SOLUTION INTRAVENOUS PRN
Status: DISCONTINUED | OUTPATIENT
Start: 2021-05-09 | End: 2021-05-11 | Stop reason: HOSPADM

## 2021-05-09 RX ORDER — ASPIRIN 81 MG/1
81 TABLET ORAL DAILY
Status: DISCONTINUED | OUTPATIENT
Start: 2021-05-10 | End: 2021-05-11 | Stop reason: HOSPADM

## 2021-05-09 RX ORDER — SODIUM CHLORIDE 0.9 % (FLUSH) 0.9 %
5-40 SYRINGE (ML) INJECTION PRN
Status: DISCONTINUED | OUTPATIENT
Start: 2021-05-09 | End: 2021-05-11 | Stop reason: HOSPADM

## 2021-05-09 RX ORDER — PANTOPRAZOLE SODIUM 40 MG/1
40 TABLET, DELAYED RELEASE ORAL
Status: DISCONTINUED | OUTPATIENT
Start: 2021-05-10 | End: 2021-05-11 | Stop reason: HOSPADM

## 2021-05-09 RX ORDER — 0.9 % SODIUM CHLORIDE 0.9 %
1000 INTRAVENOUS SOLUTION INTRAVENOUS ONCE
Status: COMPLETED | OUTPATIENT
Start: 2021-05-09 | End: 2021-05-09

## 2021-05-09 RX ADMIN — APIXABAN 5 MG: 5 TABLET, FILM COATED ORAL at 21:01

## 2021-05-09 RX ADMIN — Medication 10 ML: at 21:01

## 2021-05-09 RX ADMIN — SODIUM CHLORIDE 1000 ML: 9 INJECTION, SOLUTION INTRAVENOUS at 17:39

## 2021-05-09 ASSESSMENT — ENCOUNTER SYMPTOMS
SORE THROAT: 0
VOMITING: 1
NAUSEA: 1
BACK PAIN: 0
EYE PAIN: 0
DIARRHEA: 0
WHEEZING: 0
ABDOMINAL PAIN: 0
SINUS PRESSURE: 0
EYE DISCHARGE: 0
EYE REDNESS: 0
SHORTNESS OF BREATH: 0
COUGH: 0

## 2021-05-09 ASSESSMENT — PAIN DESCRIPTION - ORIENTATION: ORIENTATION: LEFT

## 2021-05-09 ASSESSMENT — PAIN DESCRIPTION - LOCATION: LOCATION: ARM

## 2021-05-09 ASSESSMENT — PAIN DESCRIPTION - PAIN TYPE: TYPE: ACUTE PAIN

## 2021-05-09 ASSESSMENT — PAIN SCALES - GENERAL
PAINLEVEL_OUTOF10: 6
PAINLEVEL_OUTOF10: 6

## 2021-05-09 ASSESSMENT — PAIN DESCRIPTION - FREQUENCY: FREQUENCY: CONTINUOUS

## 2021-05-09 NOTE — Clinical Note
Patient Class: Inpatient [101]   REQUIRED: Diagnosis: AMS (altered mental status) [9106530]   Estimated Length of Stay: Estimated stay of more than 2 midnights   Future Attending Provider: Parag Wilder [[id number]]   Admitting Provider: Parag Wilder [[id number]]   Telemetry/Cardiac Monitoring Required?: Yes

## 2021-05-09 NOTE — ED PROVIDER NOTES
Chief Complaint   Patient presents with    Altered Mental Status     1130am wife states patient vomited in bed, bit his tongue and was talking jibberish. LKW 8am.     Arm Pain     Arm pain that started this morning. Patient is a 79-year-old male presents today for altered mental status. Per patient's wife he woke up around 8:00 this morning, states he did not feel well. At this time patient's wife left for [place], and patient went back to bed. When his wife arrived home a little before 11 AM this morning she said she was in bed sleeping. She went to check on him again noticed that he was laying in his own vomit. He was very confused at that time, wife states he was speaking gibberish. She says over the next hour or so the symptoms did start to improve. On presentation he is speaking clearly. Has no numbness, tingling, weakness in his extremities. Denies fall or injury. He is not remember the events that happened this morning, however states when he woke up he did notice that he did bite the left side of his tongue. Denies history of seizures. Denies loss of bowel or bladder control. Patient is on anticoagulation for a fib. The history is provided by the patient. No  was used. Review of Systems   Constitutional: Negative for chills and fever. HENT: Negative for ear pain, sinus pressure and sore throat. Eyes: Negative for pain, discharge and redness. Respiratory: Negative for cough, shortness of breath and wheezing. Cardiovascular: Negative for chest pain. Gastrointestinal: Positive for nausea and vomiting. Negative for abdominal pain and diarrhea. Genitourinary: Negative for dysuria and frequency. Musculoskeletal: Negative for arthralgias and back pain. Skin: Negative for rash and wound. Neurological: Positive for speech difficulty. Negative for weakness and headaches. Hematological: Negative for adenopathy.    All other systems reviewed and are negative. Physical Exam  Vitals signs and nursing note reviewed. Constitutional:       General: He is not in acute distress. Appearance: He is well-developed. He is not ill-appearing, toxic-appearing or diaphoretic. HENT:      Head: Normocephalic and atraumatic. Mouth/Throat:      Comments: L tongue laceration  Eyes:      Pupils: Pupils are equal, round, and reactive to light. Neck:      Musculoskeletal: Normal range of motion and neck supple. Cardiovascular:      Rate and Rhythm: Normal rate and regular rhythm. Pulses: Normal pulses. Heart sounds: Normal heart sounds. No murmur. Pulmonary:      Effort: Pulmonary effort is normal. No respiratory distress. Breath sounds: Normal breath sounds. No wheezing or rales. Abdominal:      General: Bowel sounds are normal.      Palpations: Abdomen is soft. Tenderness: There is no abdominal tenderness. There is no guarding or rebound. Skin:     General: Skin is warm and dry. Neurological:      General: No focal deficit present. Mental Status: He is alert and oriented to person, place, and time. Cranial Nerves: No cranial nerve deficit.       Coordination: Coordination normal.      Comments: Muscle strength 5/5 in upper and lower extremities bilaterally  Sensation intact to light touch in upper and lower extremities bilaterally  Alert and oriented x3  Speech intact  No facial droop          Procedures     Labs Reviewed   COMPREHENSIVE METABOLIC PANEL - Abnormal; Notable for the following components:       Result Value    Glucose 130 (*)     All other components within normal limits   CBC WITH AUTO DIFFERENTIAL - Abnormal; Notable for the following components:    MCHC 35.1 (*)     Neutrophils % 85.7 (*)     Lymphocytes % 7.8 (*)     Neutrophils Absolute 9.85 (*)     Lymphocytes Absolute 0.90 (*)     Eosinophils Absolute 0.00 (*)     All other components within normal limits   BRAIN NATRIURETIC PEPTIDE - Abnormal; Notable for the following components:    Pro- (*)     All other components within normal limits   PROTIME-INR - Abnormal; Notable for the following components:    Protime 15.0 (*)     All other components within normal limits   URINALYSIS WITH MICROSCOPIC - Abnormal; Notable for the following components:    Ketones, Urine 15 (*)     Blood, Urine MODERATE (*)     Protein, UA 30 (*)     Mucus, UA Present (*)     RBC, UA 5-10 (*)     Bacteria, UA FEW (*)     All other components within normal limits   MAGNESIUM   TROPONIN     CT HEAD WO CONTRAST   Final Result   No acute intracranial hemorrhage or edema. XR CHEST (2 VW)   Final Result   No pneumonia or pleural effusion. EKG #1:  I personally interpreted this EKG  Time:  1555    Rate: 77  Rhythm: paced. Interpretation: atrial paced rhythm, RBBB, unchanged form previous      MDM  Number of Diagnoses or Management Options  Diagnosis management comments: Is a 79-year-old male presents today for possible syncopal episode versus altered mental status for seizure. On arrival he is alert and oriented x3, speaking full sentences. Patient does have tongue laceration. There is concern for possible seizure like activity as he was confused, was unresponsive to the event per wife, I did return back to normal baseline. Patient does not have a history of seizures before. We did recommend admission for further evaluation. Amount and/or Complexity of Data Reviewed  Clinical lab tests: reviewed  Tests in the radiology section of CPT®: reviewed                  --------------------------------------------- PAST HISTORY ---------------------------------------------  Past Medical History:  has a past medical history of Aortic valve regurgitation, Arrhythmia, Atrial fibrillation ([address] Dizziness, GERD (gastroesophageal reflux disease), Heartburn, History of stress test, Hypertension, and Lightheadedness.     Past Surgical History:  has a past surgical history that includes Tonsillectomy; pacemaker placement; and other surgical history. Social History:  reports that he has never smoked. He has never used smokeless tobacco. He reports current alcohol use of about 2.0 standard drinks of alcohol per week. He reports that he does not use drugs. Family History: family history includes Cancer in his sister; Other in his father and mother; Stroke in his father. The patients home medications have been reviewed. Allergies: Patient has no known allergies.     -------------------------------------------------- RESULTS -------------------------------------------------    LABS:  Results for orders placed or performed during the hospital encounter of 05/09/21   Comprehensive Metabolic Panel   Result Value Ref Range    Sodium 136 132 - 146 mmol/L    Potassium 4.0 3.5 - 5.0 mmol/L    Chloride 99 98 - 107 mmol/L    CO2 25 22 - 29 mmol/L    Anion Gap 12 7 - 16 mmol/L    Glucose 130 (H) 74 - 99 mg/dL    BUN 14 6 - 23 mg/dL    CREATININE 1.2 0.7 - 1.2 mg/dL    GFR Non-African American 58 >=60 mL/min/1.73    GFR African American >60     Calcium 8.9 8.6 - 10.2 mg/dL    Total Protein 6.9 6.4 - 8.3 g/dL    Albumin 3.8 3.5 - 5.2 g/dL    Total Bilirubin 0.9 0.0 - 1.2 mg/dL    Alkaline Phosphatase 58 40 - 129 U/L    ALT 21 0 - 40 U/L    AST 30 0 - 39 U/L   Magnesium   Result Value Ref Range    Magnesium 2.0 1.6 - 2.6 mg/dL   CBC Auto Differential   Result Value Ref Range    WBC 11.5 4.5 - 11.5 E9/L    RBC 4.76 3.80 - 5.80 E12/L    Hemoglobin 15.3 12.5 - 16.5 g/dL    Hematocrit 43.6 37.0 - 54.0 %    MCV 91.6 80.0 - 99.9 fL    MCH 32.1 26.0 - 35.0 pg    MCHC 35.1 (H) 32.0 - 34.5 %    RDW 14.0 11.5 - 15.0 fL    Platelets 793 642 - 817 E9/L    MPV 8.7 7.0 - 12.0 fL    Neutrophils % 85.7 (H) 43.0 - 80.0 %    Immature Granulocytes % 0.5 0.0 - 5.0 %    Lymphocytes % 7.8 (L) 20.0 - 42.0 %    Monocytes % 5.8 2.0 - 12.0 %    Eosinophils % 0.0 0.0 - 6.0 %    Basophils % 0.2 0.0 - 2.0 %    Neutrophils Absolute 9.85 (H) 1.80 - 7.30 E9/L    Immature Granulocytes # 0.06 E9/L    Lymphocytes Absolute 0.90 (L) 1.50 - 4.00 E9/L    Monocytes Absolute 0.67 0.10 - 0.95 E9/L    Eosinophils Absolute 0.00 (L) 0.05 - 0.50 E9/L    Basophils Absolute 0.02 0.00 - 0.20 E9/L   Troponin   Result Value Ref Range    Troponin <0.01 0.00 - 0.03 ng/mL   Brain Natriuretic Peptide   Result Value Ref Range    Pro- (H) 0 - 450 pg/mL   Protime-INR   Result Value Ref Range    Protime 15.0 (H) 9.3 - 12.4 sec    INR 1.3    Urinalysis with Microscopic   Result Value Ref Range    Color, UA Yellow Straw/Yellow    Clarity, UA Clear Clear    Glucose, Ur Negative Negative mg/dL    Bilirubin Urine Negative Negative    Ketones, Urine 15 (A) Negative mg/dL    Specific Gravity, UA 1.020 1.005 - 1.030    Blood, Urine MODERATE (A) Negative    pH, UA 6.5 5.0 - 9.0    Protein, UA 30 (A) Negative mg/dL    Urobilinogen, Urine 1.0 <2.0 E.U./dL    Nitrite, Urine Negative Negative    Leukocyte Esterase, Urine Negative Negative    Mucus, UA Present (A) None Seen /LPF    WBC, UA 1-3 0 - 5 /HPF    RBC, UA 5-10 (A) 0 - 2 /HPF    Bacteria, UA FEW (A) None Seen /HPF       RADIOLOGY:  CT HEAD WO CONTRAST   Final Result   No acute intracranial hemorrhage or edema. XR CHEST (2 VW)   Final Result   No pneumonia or pleural effusion.                 ------------------------- NURSING NOTES AND VITALS REVIEWED ---------------------------  Date / Time Roomed:  5/9/2021  4:51 PM  ED Bed Assignment:  09/09    The nursing notes within the ED encounter and vital signs as below have been reviewed.      Patient Vitals for the past 24 hrs:   BP Temp Pulse Resp SpO2 Height Weight   05/09/21 1855 (!) 149/88 -- 75 13 97 % -- --   05/09/21 1740 (!) 147/82 -- 75 16 96 % -- --   05/09/21 1540 (!) 146/70 -- -- 16 -- 6' (1.829 m) 210 lb (95.3 kg)   05/09/21 1532 -- 99.3 °F (37.4 °C) 76 -- 91 % -- --       Oxygen Saturation Interpretation: Normal    ------------------------------------------ PROGRESS NOTES ------------------------------------------    Counseling:  I have spoken with the patient and discussed todays results, in addition to providing specific details for the plan of care and counseling regarding the diagnosis and prognosis. Their questions are answered at this time and they are agreeable with the plan of admission.    --------------------------------- ADDITIONAL PROVIDER NOTES ---------------------------------  Consultations:  Spoke with [facility]. Discussed case. They will admit the patient. This patient's ED course included: a personal history and physicial examination    This patient has remained hemodynamically stable during their ED course. Medications   0.9 % sodium chloride bolus (0 mLs Intravenous Stopped 5/9/21 2391)         Diagnosis:  1. Altered mental status, unspecified altered mental status type        Disposition:  Patient's disposition: Admit to telemetry  Patient's condition is stable.              Devyn Devine DO  Resident  05/09/21 [id number]

## 2021-05-09 NOTE — ED NOTES
Bed: 09  Expected date:   Expected time:   Means of arrival:   Comments:  triage     Jed Henderson RN  05/09/21 [id number]

## 2021-05-09 NOTE — ED NOTES
Pt c/o vomiting episode today AM. C/o left arm pain. Pt wife states around 11 am he was speaking \"jiberish\". Denies HA, CP, SOB, dizziness. Hx afib, pacemaker on eliquis.        Frank Blake RN  05/09/21 1104

## 2021-05-10 ENCOUNTER — APPOINTMENT (OUTPATIENT)
Dept: GENERAL RADIOLOGY | Age: 81
End: 2021-05-10
Payer: MEDICARE

## 2021-05-10 ENCOUNTER — APPOINTMENT (OUTPATIENT)
Dept: NEUROLOGY | Age: 81
End: 2021-05-10
Payer: MEDICARE

## 2021-05-10 PROBLEM — S01.512A TONGUE LACERATION, INITIAL ENCOUNTER: Status: ACTIVE | Noted: 2021-05-10

## 2021-05-10 PROBLEM — M79.602 LEFT ARM PAIN: Status: ACTIVE | Noted: 2021-05-10

## 2021-05-10 LAB
CHOLESTEROL, TOTAL: 137 MG/DL (ref 0–199)
EKG ATRIAL RATE: 77 BPM
EKG P AXIS: 96 DEGREES
EKG P-R INTERVAL: 230 MS
EKG Q-T INTERVAL: 400 MS
EKG QRS DURATION: 146 MS
EKG QTC CALCULATION (BAZETT): 452 MS
EKG R AXIS: -68 DEGREES
EKG T AXIS: 23 DEGREES
EKG VENTRICULAR RATE: 77 BPM
HCT VFR BLD CALC: 42.2 % (ref 37–54)
HDLC SERPL-MCNC: 32 MG/DL
HEMOGLOBIN: 14.8 G/DL (ref 12.5–16.5)
LACTIC ACID: 1.5 MMOL/L (ref 0.5–2.2)
LDL CHOLESTEROL CALCULATED: 86 MG/DL (ref 0–99)
MCH RBC QN AUTO: 32.3 PG (ref 26–35)
MCHC RBC AUTO-ENTMCNC: 35.1 % (ref 32–34.5)
MCV RBC AUTO: 92.1 FL (ref 80–99.9)
PDW BLD-RTO: 14.3 FL (ref 11.5–15)
PLATELET # BLD: 162 E9/L (ref 130–450)
PMV BLD AUTO: 8.5 FL (ref 7–12)
RBC # BLD: 4.58 E12/L (ref 3.8–5.8)
TOTAL CK: 1007 U/L (ref 20–200)
TOTAL CK: 665 U/L (ref 20–200)
TOTAL CK: 878 U/L (ref 20–200)
TRIGL SERPL-MCNC: 95 MG/DL (ref 0–149)
TROPONIN: <0.01 NG/ML (ref 0–0.03)
VLDLC SERPL CALC-MCNC: 19 MG/DL
WBC # BLD: 10.3 E9/L (ref 4.5–11.5)

## 2021-05-10 PROCEDURE — 99204 OFFICE O/P NEW MOD 45 MIN: CPT | Performed by: PSYCHIATRY & NEUROLOGY

## 2021-05-10 PROCEDURE — 84146 ASSAY OF PROLACTIN: CPT

## 2021-05-10 PROCEDURE — G0378 HOSPITAL OBSERVATION PER HR: HCPCS

## 2021-05-10 PROCEDURE — 84484 ASSAY OF TROPONIN QUANT: CPT

## 2021-05-10 PROCEDURE — 6370000000 HC RX 637 (ALT 250 FOR IP): Performed by: FAMILY MEDICINE

## 2021-05-10 PROCEDURE — 80061 LIPID PANEL: CPT

## 2021-05-10 PROCEDURE — 97161 PT EVAL LOW COMPLEX 20 MIN: CPT

## 2021-05-10 PROCEDURE — 85027 COMPLETE CBC AUTOMATED: CPT

## 2021-05-10 PROCEDURE — 73030 X-RAY EXAM OF SHOULDER: CPT

## 2021-05-10 PROCEDURE — 2580000003 HC RX 258: Performed by: FAMILY MEDICINE

## 2021-05-10 PROCEDURE — 82550 ASSAY OF CK (CPK): CPT

## 2021-05-10 PROCEDURE — 95816 EEG AWAKE AND DROWSY: CPT | Performed by: PSYCHIATRY & NEUROLOGY

## 2021-05-10 PROCEDURE — 83605 ASSAY OF LACTIC ACID: CPT

## 2021-05-10 PROCEDURE — 93010 ELECTROCARDIOGRAM REPORT: CPT | Performed by: INTERNAL MEDICINE

## 2021-05-10 PROCEDURE — 95819 EEG AWAKE AND ASLEEP: CPT

## 2021-05-10 PROCEDURE — 36415 COLL VENOUS BLD VENIPUNCTURE: CPT

## 2021-05-10 RX ORDER — SODIUM CHLORIDE 9 MG/ML
INJECTION, SOLUTION INTRAVENOUS CONTINUOUS
Status: DISCONTINUED | OUTPATIENT
Start: 2021-05-10 | End: 2021-05-11 | Stop reason: HOSPADM

## 2021-05-10 RX ADMIN — ASPIRIN 81 MG: 81 TABLET, COATED ORAL at 09:38

## 2021-05-10 RX ADMIN — PANTOPRAZOLE SODIUM 40 MG: 40 TABLET, DELAYED RELEASE ORAL at 05:46

## 2021-05-10 RX ADMIN — APIXABAN 5 MG: 5 TABLET, FILM COATED ORAL at 09:38

## 2021-05-10 RX ADMIN — SODIUM CHLORIDE: 9 INJECTION, SOLUTION INTRAVENOUS at 18:17

## 2021-05-10 RX ADMIN — LOSARTAN POTASSIUM 50 MG: 25 TABLET, FILM COATED ORAL at 09:35

## 2021-05-10 RX ADMIN — APIXABAN 5 MG: 5 TABLET, FILM COATED ORAL at 20:23

## 2021-05-10 RX ADMIN — SODIUM CHLORIDE: 9 INJECTION, SOLUTION INTRAVENOUS at 05:47

## 2021-05-10 RX ADMIN — METOPROLOL SUCCINATE 50 MG: 25 TABLET, EXTENDED RELEASE ORAL at 09:35

## 2021-05-10 RX ADMIN — Medication 10 ML: at 20:23

## 2021-05-10 RX ADMIN — Medication 10 ML: at 09:35

## 2021-05-10 ASSESSMENT — PAIN DESCRIPTION - FREQUENCY: FREQUENCY: CONTINUOUS

## 2021-05-10 ASSESSMENT — PAIN DESCRIPTION - PAIN TYPE: TYPE: ACUTE PAIN

## 2021-05-10 ASSESSMENT — PAIN DESCRIPTION - PROGRESSION
CLINICAL_PROGRESSION: NOT CHANGED
CLINICAL_PROGRESSION: NOT CHANGED

## 2021-05-10 ASSESSMENT — PAIN SCALES - GENERAL
PAINLEVEL_OUTOF10: 0
PAINLEVEL_OUTOF10: 0

## 2021-05-10 ASSESSMENT — PAIN DESCRIPTION - DESCRIPTORS: DESCRIPTORS: ACHING

## 2021-05-10 ASSESSMENT — PAIN DESCRIPTION - LOCATION: LOCATION: ARM

## 2021-05-10 ASSESSMENT — PAIN DESCRIPTION - ORIENTATION: ORIENTATION: LEFT

## 2021-05-10 NOTE — CONSULTS
Reason for consultation: possible seizure    HPI: Conception Marjorie is a 79 y.o. male w/ PMHx of afib s/p ablation and pacemaker on Elloquis, aortic valve replacement, RBBB, and HTN who presented 05/09/2021 with wife due to being found unresponsive in bed covered in emesis. Patient noted by wife to be altered, had garbled speech, was biting his tongue, and had LUE pain (moderate achy pain that is on and off and worse w/ movement, associated with weakness). ER course: patient had a cough, was unsteady on his feet. Patient denied chest pain, SOB, focal leg weakness, headache, dizziness, history of seizures. Physical exam showed AMS and a tongue laceration. EKG: EKG showed atrial paced rhythm rate of 77, ST depression in V4 through V6, Q waves in V2 and 3, unchanged from prior. Imaging: negative for acute pathology in head and chest. L arm imaging ordered. Patient was admitted for further work-up and management    SHx: recently returned from a 5 day golf trip to [place], had bilateral leg swelling attributed to drinking more EtOH and eating more salt    Today, patient endorses pain in his tongue unchanged from yesterday and pain w/ weakness in his shoulder that is worsened with movement unchanged from yesterday. Patient denies HA, visual changes, hearing changes, difficulty swallowing, chest pain, palpitations, SOB, abdominal pain, N/V, parenthesia, and weakness to the RUE and BLE. Patient has passed urine and consumed food and water since admission. Patient has not had a BM or flatus since admission. ROS: Full ROS reviewed and negative except as mentioned above.     PAST MEDICAL HISTORY:      Diagnosis Date    Aortic valve regurgitation     Arrhythmia     Atrial fibrillation (HCC)     Dizziness     GERD (gastroesophageal reflux disease)     Heartburn     History of stress test     Hypertension     Lightheadedness        PAST SURGICAL HISTORY:      Procedure Laterality Date    OTHER SURGICAL HISTORY Ablation Cardiac    PACEMAKER PLACEMENT      TONSILLECTOMY         ALLERGIES:  Patient has no known allergies. Home Medications Reviewed:  apixaban, hydroCHLOROthiazide, losartan, metoprolol succinate, and omeprazole      PHYSICAL EXAMINATION  Vitals   Vitals:    05/09/21 2102 05/09/21 2328 05/10/21 0425 05/10/21 0800   BP:  (!) 141/85 (!) 146/82 132/68   Pulse: 75 76 75 78   Resp: 16 18 16 20   Temp:  98.2 °F (36.8 °C) 98.2 °F (36.8 °C) 97.2 °F (36.2 °C)   TempSrc:  Oral Oral Temporal   SpO2:  97% 98% 96%   Weight:       Height:            General: Patient appears in no acute distress with a normal body habitus  HEENT: Normocephalic, atraumatic. Tongue has two small 0.5-1cm lacerations on the L tongue w/ ecchymosis of the left front of the tongue, no active bleeding present on tongue. Remainder of mouth normal.   Chest: Clear to auscultation bilaterally  Heart: Regular rate and rhythm, no murmurs appreciated  Extremities: No edema or cyanosis noted, ROM in LUE limited in extension and abduction of shoulder and extension of elbow    Neurologic Examination    Mental Status  Alert, and oriented to person, place and time with normal speech and language. No evidence of aphasia during conversation. No evidence of memory impairment. Attention and concentration appeared normal.     Cranial Nerves  II. Visual fields full to confrontation bilaterally. III, IV, VI: Pupils equally round and reactive to light, 3 to 2 mm bilaterally. EOMs: full, no nystagmus. V. Facial sensation intact to light touch bilaterally  VII: Facial movements symmetric and strong  VIII: Hearing intact to voice  IX,X: Palate elevates symmetrically.  No dysarthria  XI: Sternocleidomastoid and trapezius 5/5 bilaterally   XII: Tongue is midline    Motor     Right Left   Right Left   Deltoid 5 4  Hip Flexion 5 5   Biceps      5  5  Knee Extension 5 5   Triceps 5 4  Knee Flexion 5 5   Handgrip 5 5  Ankle Dorsiflexion 5 5       Ankle Attestation  Patient with new onset seizures. No prior history or family history of seizures. No new medications implicated that would lower seizure threshold. EEG unremarkable. Discussed seizure safety precautions including driving restrictions with patient and wife.      Would refrain from starting AED at present given first time event. MRI brain to be done as outpatient due to pacer compatibility issues. Follow up in neurology clinic. Neurology will sign off at this time. Please contact our service with any further questions or concerns.      I have seen and evaluated this patient. I discussed the chief complaint, history of present illness, and review of systems as well as the past medical/social/family history sections for this patient. I have examined this patient and participated in the care of this patient. I have reviewed the pertinent clinical information including physical exam, labs, and radiographic studies. I have discussed the patient's care and plan with the resident/NP/PA/medical student.  Please see pertinent Consult or Medical Progress Note for further details.     Electronically signed by: Inna Andrea DO, 5/10/2021 3:46 PM

## 2021-05-10 NOTE — PROCEDURES
[address] Report    MRN: [id number]   PATIENT NAME: Wm Julian   DATE OF REPORT: 5/10/2021    DATE OF SERVICE: 5/10/2021   PHYSICIAN NAME: Daja Del Castillo DO   Referring Physician: Daja Del Castillo DO       Patient's : 1940   Patient's Age: 79 y.o. Gender: male     PROCEDURE: Routine EEG with video      Clinical Interpretation: This abnormal study showed evidence of:    1. Mild nonspecific cerebral dysfunction of the bilateral anterior temporal regions    Structural abnormalities should be considered for the findings above and appropriate imaging obtained if clinically indicated. No seizures or definite epileptiform discharges were noted during this study. ____________________________  Electronically signed by: Daja Del Castillo DO, 5/10/2021 12:25 PM      Patient Clinical Information   Reason for Study: Patient with history of stroke undergoing evaluation for possible seizure  Patient State: Awake  Primary neurological diagnosis: Spell of uncertain etiology   Primary indication for monitoring: Characterization of spells    Pertinent Medications and Treatments    apixaban     Lorazepam    Sedatives administered: No  Intubated: No  Pharmacological paralytic: No    Reporting Period  Start of Study: 623, 5/10/2021   End of Study:  0903, 5/10/2021       EEG Description  Digital video and scalp EEG monitoring was performed using the standard protocol for this laboratory. Scalp electrodes were applied in the international 10/20 system. Multiple digital montage arrangements were utilized for evaluation. EKG and video were recorded.      Background:      Occipital rhythm (posterior dominant rhythm or PDR): Present   Frequency: 9.5 Hz  Voltage: Medium   Organization: good   Reactivity to eye opening/closure: good    Drowsiness: Present - normal  Sleep: Absent    Technical and Activation Procedures:  Hyperventilation: Not done        Photic stimulation: Not done        Reactivity to stimulation: Yes    Abnormalities:    I. Seizures? No    II. Rhythmic or Periodic Patterns? Rare quasi-rhythmic delta activity noted at F7, T3    III. Other Abnormalities?         Occasional irregular delta activity noted independently at F8, T4 and F7, T3

## 2021-05-10 NOTE — PLAN OF CARE
Problem: Falls - Risk of:  Goal: Will remain free from falls  Description: Will remain free from falls  Outcome: Met This Shift  Goal: Absence of physical injury  Description: Absence of physical injury  Outcome: Met This Shift     Problem: Coping:  Goal: Ability to remain calm will improve  Description: Ability to remain calm will improve  Outcome: Met This Shift     Problem: Safety:  Goal: Ability to remain free from injury will improve  Description: Ability to remain free from injury will improve  Outcome: Met This Shift     Problem: Pain:  Goal: Pain level will decrease  Description: Pain level will decrease  Outcome: Not Met This Shift

## 2021-05-10 NOTE — PROGRESS NOTES
While answering questions for MRI checklist patient stated 'I don't remember who put my pacemaker in, I believe it was done at [facility]!\"    Addendum: This nurse spoke with Marin Corral, staff member with patient's PCP, Dr Marisa De La Cruz. Tati Rosa stated \" The patient had a S-P Dual Chamber Pacemaker placed on 05/26/2017, his Cardiologist is Dr Joyce Vuong! \"    Addendum: This nurse spoke with Kimberlyn Manzano, office staff for patient's Cardiologist, Dr Joyce Vuong. Kimberlyn Manzano stated 'Mr Nikole Stephen has a [address] Pacemaker, our rep is Anita Diaz, he can be reached at [phone]! \" (phone number has been disconnected)    Brooklynn Hogan, [facility], notified.

## 2021-05-10 NOTE — PROGRESS NOTES
Hospitalist Progress Note      SYNOPSIS: Patient admitted on 2021 for altered mental status or unresponsive. He was admitted for evaluation of seizure and left arm pain. SUBJECTIVE:    Patient seen and examined in his room. Speech appears normal  Still complains of left arm pain but denies any weakness or heaviness. No chest pain, shortness of breath, nausea, vomiting. Records reviewed. Stable overnight. No other overnight issues reported. Temp (24hrs), Av.6 °F (37 °C), Min:98.2 °F (36.8 °C), Max:99.3 °F (37.4 °C)    DIET: DIET CARDIAC;  CODE: Full Code    Intake/Output Summary (Last 24 hours) at 5/10/2021 0739  Last data filed at 5/10/2021 0556  Gross per 24 hour   Intake 1050 ml   Output 350 ml   Net 700 ml       OBJECTIVE:    BP (!) 146/82   Pulse 75   Temp 98.2 °F (36.8 °C) (Oral)   Resp 16   Ht 6' (1.829 m)   Wt 210 lb (95.3 kg)   SpO2 98%   BMI 28.48 kg/m²     General appearance: No apparent distress, appears stated age and cooperative. HEENT:  Conjunctivae/corneas clear. Neck: Supple. No jugular venous distention. Respiratory: Clear to auscultation bilaterally, normal respiratory effort  Cardiovascular: Regular rate rhythm, normal S1-S2  Abdomen: Soft, nontender, nondistended  Musculoskeletal: No clubbing, cyanosis, no bilateral lower extremity edema. Brisk capillary refill.    Skin:  No rashes  on visible skin  Neurologic: awake, alert and following commands     ASSESSMENT & PLAN:    Altered mental status/unresponsiveness  Suspicion for seizure  Rule out stroke  CT head negative  Waiting for neuro evaluation and MRI brain    Suspected seizures  Neuro consult and EEG  Seizure precautions    Left arm pain  Unclear etiology  Some trauma during seizure could not be ruled out  CPK elevated to 1000, recheck in the morning of   Obtain x-ray left shoulder to rule out any fracture or dislocation    Rhabdomyolysis  >> 1007  Technically does not meet criteria for rhabdomyolysis  Would continue to trend CPK every 24 hours until it starts trending down  Maintain normal normal saline at 100 cc/h    Superficial laceration of tongue  Topical care  Etiology likely trauma during seizure    Paroxysmal atrial fibrillation  Status post pacemaker  Continue Eliquis per anticoagulation  Rate controlled with metoprolol    Hypertension  Poorly controlled  Resumed home medication  Continue to monitor for now      DISPOSITION:     Medications:  REVIEWED DAILY    Infusion Medications    sodium chloride 100 mL/hr at 05/10/21 0547    sodium chloride       Scheduled Medications    apixaban  5 mg Oral BID    pantoprazole  40 mg Oral QAM AC    sodium chloride flush  5-40 mL Intravenous 2 times per day    aspirin  81 mg Oral Daily    Or    aspirin  300 mg Rectal Daily    losartan  50 mg Oral Daily    metoprolol succinate  50 mg Oral Daily     PRN Meds: sodium chloride flush, sodium chloride, promethazine **OR** ondansetron, polyethylene glycol, LORazepam    Labs:     Recent Labs     05/09/21  1551 05/10/21  0239   WBC 11.5 10.3   HGB 15.3 14.8   HCT 43.6 42.2    162       Recent Labs     05/09/21  1551      K 4.0   CL 99   CO2 25   BUN 14   CREATININE 1.2   CALCIUM 8.9       Recent Labs     05/09/21  1551   PROT 6.9   ALKPHOS 58   ALT 21   AST 30   BILITOT 0.9       Recent Labs     05/09/21  1551   INR 1.3       Recent Labs     05/09/21  1551 05/09/21  2051 05/10/21  0239   CKTOTAL  --  878* 1,007*   TROPONINI <0.01 <0.01 <0.01       Chronic labs:    Lab Results   Component Value Date    CHOL 137 05/10/2021    TRIG 95 05/10/2021    HDL 32 05/10/2021    LDLCALC 86 05/10/2021    TSH 0.805 01/26/2021    PSA 7.02 (H) 06/06/2018    INR 1.3 05/09/2021    LABA1C 5.0 01/27/2021       Radiology: REVIEWED DAILY    +++++++++++++++++++++++++++++++++++++++++++++++++  LASHONDA Shukla/ Quentin [address] 18, [place]  +++++++++++++++++++++++++++++++++++++++++++++++++  NOTE: This report was transcribed using voice recognition software. Every effort was made to ensure accuracy; however, inadvertent computerized transcription errors may be present.

## 2021-05-10 NOTE — PROGRESS NOTES
Dr Donny Mccloud notified of patient's pacemaker being incompatible with MRI, via [employer]. Read 1225.

## 2021-05-10 NOTE — H&P
Hospital Medicine History & Physical      PCP: Victor Manuel Caal MD    Date of Admission: 5/9/2021    Date of Service: Pt seen/examined on 5/9/2021 and Placed in Observation. Chief Complaint: Altered mental status      History Of Present Illness:      79 y.o. male who presented to [facility] with medical history of BPH, atrial fibrillation status post pacemaker on anticoagulation with Eliquis, right bundle branch block, PVCs, osteoarthritis of the hip, hypertension, GERD, and aortic valve regurgitation. Patient was in his usual state of health until last night when he went to bed. He woke up this morning not feeling well. He did not go discharge with his wife. His wife came back and found him unresponsive in the bed covered in emesis. He was altered, but her mental status was constant, severe, associated with garbled speech, tongue biting and left arm pain. He had a second episode of emesis at home. He denies any prior history of seizure disorder. The pain in his left arm is moderate, achy, on and off and worse with movement. No associated swelling. He has some weakness there. No chest pain or shortness of breath. He has a cough, unsteady on his feet, denies any focal leg weakness. No headache. No dizziness. Vital signs notable for blood pressure 149/88, labs showed glucose of 130, normal CBC, negative troponin, INR 1.3 and urinalysis that is positive for ketones, blood and protein. EKG showed atrial paced rhythm rate of 77, ST depression in V4 through V6, Q waves in V2 and 3, unchanged from prior. CT scan of the head and chest x-ray were negative. Last echo was in September 2019, EF was 60%, has mild MR/TR and no PFO. He is being admitted for further management.         Past Medical History:          Diagnosis Date    Aortic valve regurgitation     Arrhythmia     Atrial fibrillation (HCC)     Dizziness     GERD (gastroesophageal reflux disease)     Heartburn     History of stress test  Hypertension     Lightheadedness        Past Surgical History:          Procedure Laterality Date    OTHER SURGICAL HISTORY      Ablation Cardiac    PACEMAKER PLACEMENT      TONSILLECTOMY         Medications Prior to Admission:      Prior to Admission medications    Medication Sig Start Date End Date Taking? Authorizing Provider   hydroCHLOROthiazide (MICROZIDE) 12.5 MG capsule Take 1 capsule by mouth daily as needed (edema of legs) 5/6/21 6/5/21 Yes Beryle Noss, MD   omeprazole (PRILOSEC) 40 MG delayed release capsule Take 1 capsule by mouth daily 12/1/20  Yes Beryle Noss, MD   metoprolol succinate (TOPROL XL) 50 MG extended release tablet TAKE 1 TABLET BY MOUTH ONCE DAILY 4/8/20  Yes Historical Provider, MD   losartan (COZAAR) 50 MG tablet Take 50 mg by mouth daily   Yes Historical Provider, MD   apixaban (ELIQUIS) 5 MG TABS tablet Take 1 tablet by mouth 2 times daily 10/11/16  Yes Trupti García MD       Allergies:  Patient has no known allergies. Social History:      The patient currently lives at home with his wife  TOBACCO:   reports that he has never smoked. He has never used smokeless tobacco.  ETOH:   reports current alcohol use of about 2.0 standard drinks of alcohol per week. Family History:     Reviewed in detail Positive as follows:        Problem Relation Age of Onset    Other Mother         brain aneurysm    Stroke Father     Other Father         pacemaker    Cancer Sister        REVIEW OF SYSTEMS:   Pertinent positives as noted in the HPI. All other systems reviewed and negative. PHYSICAL EXAM:    BP (!) 141/85   Pulse 76   Temp 98.2 °F (36.8 °C) (Oral)   Resp 18   Ht 6' (1.829 m)   Wt 210 lb (95.3 kg)   SpO2 97%   BMI 28.48 kg/m²     General appearance: Elderly male, ill-appearing and weak, mild painful  distress, appears stated age and cooperative. HEENT:  Normal cephalic, atraumatic without obvious deformity. Pupils equal, round, and reactive to light. Extra ocular muscles intact. Conjunctivae/corneas clear. Superficial laceration on the left side of the tongue with bruising  Neck: Supple, with full range of motion. No jugular venous distention. Trachea midline. Respiratory:  Normal respiratory effort. Clear to auscultation, bilaterally without Rales/Wheezes/Rhonchi. Cardiovascular:  irregular rate and rhythm with normal S1/S2 without murmurs, rubs or gallops. Abdomen: Soft, non-tender, non-distended with normal bowel sounds. Musculoskeletal:  No clubbing, cyanosis or edema bilaterally. Limited range of motion left arm due to pain without deformity. Skin: Skin color, texture, turgor normal.  No rashes or lesions. Neurologic: Mild weakness left arm. Cranial nerves: II-XII intact,   Psychiatric:  Alert and oriented, thought content appropriate, normal insight  Capillary Refill: Brisk,< 3 seconds   Peripheral Pulses: +2 palpable, equal bilaterally       Labs:     Recent Labs     05/09/21  1551   WBC 11.5   HGB 15.3   HCT 43.6        Recent Labs     05/09/21  1551      K 4.0   CL 99   CO2 25   BUN 14   CREATININE 1.2   CALCIUM 8.9     Recent Labs     05/09/21  1551   AST 30   ALT 21   BILITOT 0.9   ALKPHOS 58     Recent Labs     05/09/21  1551   INR 1.3     Recent Labs     05/09/21  1551 05/09/21 2051   TROPONINI <0.01 <0.01       Urinalysis:      Lab Results   Component Value Date    NITRU Negative 05/09/2021    WBCUA 1-3 05/09/2021    BACTERIA FEW 05/09/2021    RBCUA 5-10 05/09/2021    BLOODU MODERATE 05/09/2021    SPECGRAV 1.020 05/09/2021    GLUCOSEU Negative 05/09/2021       Radiology:   Reviewed and documented    CT HEAD WO CONTRAST   Final Result   No acute intracranial hemorrhage or edema. XR CHEST (2 VW)   Final Result   No pneumonia or pleural effusion.          MRI brain with and without contrast    (Results Pending)       ASSESSMENT:    Active Hospital Problems    Diagnosis Date Noted    Left arm pain [M79.602] 05/10/2021    Tongue laceration, initial encounter [M60.909V] 05/10/2021    AMS (altered mental status) [R41.82] 05/09/2021    Anticoagulated [Z79.01] 07/14/2020    Paroxysmal atrial fibrillation ([facility] [address] [I48.0] 11/27/2016    Accelerated hypertension [I10] 09/07/2016   . Suspected seizure      PLAN:  1. Altered mental status, presentation suspicious for seizure, rule out stroke. Patient is back to baseline. CT scan is negative. May not be able to obtain MRI due to presence of pacemaker, MRI has been ordered, would evaluate if his pacemaker is compatible. Neurochecks. Seizure precaution. 2.  Suspected seizure, MRI if able to obtain and EEG. Neurology consult. 3.  Left arm pain, possible etiologies include seizure involving the left side and possible neuropraxia due to him laying on the side for too long. Obtain CPK to rule out rhabdomyolysis. Cardiac enzymes. 4.  Superficial laceration of tongue following suspected seizure. Does not need suturing. 5.  Paroxysmal atrial fibrillation status post pacemaker on anticoagulation with Eliquis. Continue medication. 6.  Accelerated hypertension, patient did not get his home blood pressure medications tonight yet. Permissive elevation of blood pressure due to possible stroke. Resume home medications as appropriate        DVT Prophylaxis: Eliquis  Diet: DIET CARDIAC;  Code Status: Full Code    PT/OT Eval Status: As needed    Dispo -observation/telemetry. Royal Gabriel MD    Thank you Sharon Torres MD for the opportunity to be involved in this patient's care.

## 2021-05-10 NOTE — PROGRESS NOTES
Occupational Therapy  Attempted OT eval 3x this date. 1st attempt, pt was off the unit for EEG. 2nd attempt, pt was eating meal.  3rd attempt, pt was undergoing bedside Neurology Assessment. Will attempt OT eval next date.   Thank you for this referral. Nivia Villegas, MOT, OTR/L  # [id number]

## 2021-05-10 NOTE — PROGRESS NOTES
Per DIVYA Yo Milling patient has a [employer] pacemaker. With not having model number to look pacer up- MRI called [employer] and they informed us that the patient does NOT have a compatible pacemaker and cannot be scanned. Order d/c per protocol.

## 2021-05-10 NOTE — PROGRESS NOTES
education in this area   Yes  Yes  no     ASSESSMENT:    Comments:  Pt received supine and agreeable to PT evaluation. Vitals monitored during session. Performs bed mobility without assistance. Very mild unsteadiness during gait noted, pt held IV pole with no LOB. States he feels his gait is at baseline. Performed 4 steps with ascension/descension requiring min A for balance/steadiness. Ambulated back to room without incident. Wife present. Recommended assistance for stairs at home for safety until feeling 100% at baseline. Recommended outpatient PT follow up if L shoulder issues persist.  Pt left sitting EOB with call button in reach, lines attached, and needs met. Treatment:  Patient practiced and was instructed in the following treatment:     eval only     Pt's/ family goals   1. Home with wife     Patient and or family understand(s) diagnosis, prognosis, and plan of care. Yes     PLAN:    Current Treatment Recommendations     [x] Strengthening     [x] ROM   [x] Balance Training   [x] Endurance Training   [x] Transfer Training   [x] Gait Training   [x] Stair Training   [x] Positioning   [x] Safety and Education Training   [x] Patient/Caregiver Education   [x] HEP  [] Other     Frequency of treatments: 2-5x/week x 1-2 weeks. Time in  1125  Time out  1135    Total Treatment Time  0 minutes     Evaluation Time includes thorough review of current medical information, gathering information on past medical history/social history and prior level of function, completion of standardized testing/informal observation of tasks, assessment of data and education on plan of care and goals.     CPT codes:  [x] Low Complexity PT evaluation 40649  [] Moderate Complexity PT evaluation 89036  [] High Complexity PT evaluation 24095  [] PT Re-evaluation 67835  [] Gait training 33873 -- minutes  [] Manual therapy [id number] -- minutes  [] Therapeutic activities 96011 -- minutes  [] Therapeutic exercises 42039 -- minutes  [] Neuromuscular reeducation 72819 -- minutes     Gerald Nuñez, PT, DPT  [id number]

## 2021-05-10 NOTE — PROGRESS NOTES
5/10 11am- Spoke to The Marjorie and she has left a message with cariology office to get patient pacemaker info - will let us know once recieved. Once we find out if compatible a cardiology form will be sent to floor to get filled out. Once that is filled out a Rep will be called and MRI can be scheduled.

## 2021-05-10 NOTE — PROGRESS NOTES
Patient educated on stroke/TIA symptoms and personal risk factors. Handouts provided and reviewed with patient. He appeared to understand all information.

## 2021-05-11 VITALS
SYSTOLIC BLOOD PRESSURE: 148 MMHG | DIASTOLIC BLOOD PRESSURE: 88 MMHG | HEART RATE: 84 BPM | HEIGHT: 72 IN | WEIGHT: 210 LBS | RESPIRATION RATE: 18 BRPM | TEMPERATURE: 98 F | BODY MASS INDEX: 28.44 KG/M2 | OXYGEN SATURATION: 96 %

## 2021-05-11 LAB
ANION GAP SERPL CALCULATED.3IONS-SCNC: 8 MMOL/L (ref 7–16)
BUN BLDV-MCNC: 18 MG/DL (ref 6–23)
CALCIUM SERPL-MCNC: 8.2 MG/DL (ref 8.6–10.2)
CHLORIDE BLD-SCNC: 105 MMOL/L (ref 98–107)
CO2: 26 MMOL/L (ref 22–29)
CREAT SERPL-MCNC: 1.2 MG/DL (ref 0.7–1.2)
GFR AFRICAN AMERICAN: >60
GFR NON-AFRICAN AMERICAN: 58 ML/MIN/1.73
GLUCOSE BLD-MCNC: 92 MG/DL (ref 74–99)
HCT VFR BLD CALC: 37.7 % (ref 37–54)
HEMOGLOBIN: 13 G/DL (ref 12.5–16.5)
MCH RBC QN AUTO: 32.2 PG (ref 26–35)
MCHC RBC AUTO-ENTMCNC: 34.5 % (ref 32–34.5)
MCV RBC AUTO: 93.3 FL (ref 80–99.9)
PDW BLD-RTO: 14.1 FL (ref 11.5–15)
PLATELET # BLD: 143 E9/L (ref 130–450)
PMV BLD AUTO: 8.7 FL (ref 7–12)
POTASSIUM SERPL-SCNC: 4 MMOL/L (ref 3.5–5)
PROLACTIN: 7.49 NG/ML
PROLACTIN: 9.45 NG/ML
RBC # BLD: 4.04 E12/L (ref 3.8–5.8)
SODIUM BLD-SCNC: 139 MMOL/L (ref 132–146)
TOTAL CK: 495 U/L (ref 20–200)
WBC # BLD: 6 E9/L (ref 4.5–11.5)

## 2021-05-11 PROCEDURE — 82550 ASSAY OF CK (CPK): CPT

## 2021-05-11 PROCEDURE — 80048 BASIC METABOLIC PNL TOTAL CA: CPT

## 2021-05-11 PROCEDURE — 6370000000 HC RX 637 (ALT 250 FOR IP): Performed by: FAMILY MEDICINE

## 2021-05-11 PROCEDURE — 97535 SELF CARE MNGMENT TRAINING: CPT

## 2021-05-11 PROCEDURE — 97165 OT EVAL LOW COMPLEX 30 MIN: CPT

## 2021-05-11 PROCEDURE — 2580000003 HC RX 258: Performed by: FAMILY MEDICINE

## 2021-05-11 PROCEDURE — G0378 HOSPITAL OBSERVATION PER HR: HCPCS

## 2021-05-11 PROCEDURE — 85027 COMPLETE CBC AUTOMATED: CPT

## 2021-05-11 PROCEDURE — 36415 COLL VENOUS BLD VENIPUNCTURE: CPT

## 2021-05-11 RX ADMIN — LOSARTAN POTASSIUM 50 MG: 25 TABLET, FILM COATED ORAL at 09:40

## 2021-05-11 RX ADMIN — METOPROLOL SUCCINATE 50 MG: 25 TABLET, EXTENDED RELEASE ORAL at 09:39

## 2021-05-11 RX ADMIN — PANTOPRAZOLE SODIUM 40 MG: 40 TABLET, DELAYED RELEASE ORAL at 05:50

## 2021-05-11 RX ADMIN — Medication 10 ML: at 09:40

## 2021-05-11 RX ADMIN — APIXABAN 5 MG: 5 TABLET, FILM COATED ORAL at 09:39

## 2021-05-11 RX ADMIN — ASPIRIN 81 MG: 81 TABLET, COATED ORAL at 09:40

## 2021-05-11 ASSESSMENT — PAIN SCALES - GENERAL
PAINLEVEL_OUTOF10: 0
PAINLEVEL_OUTOF10: 0

## 2021-05-11 ASSESSMENT — PAIN DESCRIPTION - PROGRESSION: CLINICAL_PROGRESSION: NOT CHANGED

## 2021-05-11 NOTE — PLAN OF CARE
Problem: Pain:  Goal: Pain level will decrease  Description: Pain level will decrease  Outcome: Met This Shift  Goal: Control of acute pain  Description: Control of acute pain  Outcome: Met This Shift  Goal: Control of chronic pain  Description: Control of chronic pain  Outcome: Met This Shift     Problem: Falls - Risk of:  Goal: Will remain free from falls  Description: Will remain free from falls  Outcome: Met This Shift  Goal: Absence of physical injury  Description: Absence of physical injury  Outcome: Met This Shift     Problem: Coping:  Goal: Ability to remain calm will improve  Description: Ability to remain calm will improve  Outcome: Met This Shift     Problem: Safety:  Goal: Ability to remain free from injury will improve  Description: Ability to remain free from injury will improve  Outcome: Met This Shift     Problem: Self-Care:  Goal: Ability to participate in self-care as condition permits will improve  Description: Ability to participate in self-care as condition permits will improve  Outcome: Met This Shift

## 2021-05-11 NOTE — PLAN OF CARE
Problem: Pain:  Goal: Pain level will decrease  Description: Pain level will decrease  5/11/2021 0051 by [facility] [place] [place]  Outcome: Met This Shift  5/10/2021 2130 by [facility] [place]  Outcome: Met This Shift  Goal: Control of acute pain  Description: Control of acute pain  5/11/2021 0051 by [facility] [place]  Outcome: Met This Shift  5/10/2021 2130 by [facility] [place] [place]  Outcome: Met This Shift  Goal: Control of chronic pain  Description: Control of chronic pain  5/11/2021 0051 by [facility] [place]  Outcome: Met This Shift  5/10/2021 2130 by [facility] [place]  Outcome: Met This Shift     Problem: Falls - Risk of:  Goal: Will remain free from falls  Description: Will remain free from falls  5/11/2021 0051 by [facility] [place] [place]  Outcome: Met This Shift  5/10/2021 2130 by [facility] [place]  Outcome: Met This Shift  Goal: Absence of physical injury  Description: Absence of physical injury  [facility]  Outcome: Met This Shift  5/10/2021 2130 by [facility] [place] [place]  Outcome: Met This Shift     Problem: Coping:  Goal: Ability to remain calm will improve  Description: Ability to remain calm will improve  [facility]21 [facility]  Outcome: Met This Shift  5/10/2021 2130 by [facility] [place] [place]  Outcome: Met This Shift     Problem: Safety:  Goal: Ability to remain free from injury will improve  Description: Ability to remain free from injury will improve  5/11/2021 0051 by [facility] [place] [place]  Outcome: Met This Shift  5/10/2021 2130 by [facility] [place] [place]  Outcome: Met This Shift     Problem: Self-Care:  Goal: Ability to participate in self-care as condition permits will improve  Description: Ability to participate in self-care as condition permits will improve  5/11/2021 0051 by [facility]  Outcome: Met This Shift  5/10/2021 2130 by [facility]  Outcome: Met This Shift

## 2021-05-11 NOTE — PROGRESS NOTES
All discharge instructions reviewed with patient and his wife, at bedside. They both appeared to understand all instructions, asking good questions.

## 2021-05-11 NOTE — PROGRESS NOTES
Occupational Therapy  OCCUPATIONAL THERAPY INITIAL EVALUATION        Date:2021  Patient Name: Isael Ambriz  MRN: [id number]  : 1940  Room: [address] [place] [address]    Referring Physician:  Marino Quinonez MD    Evaluating OT:  KARMEN Sy, OTR/L #[id number]    AM-PAC Daily Activity Raw Score:  19/24  Recommended Adaptive Equipment:  TBD as pt progresses     Reason for Admission:  Pt was admitted w/ Nausea, Vomiting, demonstrating nonsensical speech, biting his tongue    Diagnosis:  Altered mental status, r/o Seizure     Procedures this admission:  None     Pertinent Medical History:  Pacemaker placement, A Fib, HTN       Precautions:  Falls  Seizure Precautions  Cardiac Diet    Home Living: Pt lives with his wife in a 1-story house. Bed/bath on the 1st floor. ? Basement. Bathroom setup:  Walk-in-Shower w/ Built-in Seat, Standard Commode   Equipment owned:  [employer], Adaptive equipment    Available Family Assist:  Wife can provide assist PRN    Prior Level of Function:  Pt reported IND ADLs, IADLs, Transfers and Mobility using No AD for ambulation.  Was recently on a Golfing Trip  Driving:  yes  Occupation:  None reported    Pain Level:  3/10 Left Shoulder;  Relief w/ Rest and Repositioning, ROM/AAROM, Nsg Notified   Additional Complaints:  None    Vitals/Lab Values:  WFL room air    Cognition: A & O x 4   Able to Follow Multi-Step Commands w/ Min VCs for safety   Memory:  good (-)   Sequencing:  good (-)   Problem solving:  good (-)   Judgement/safety:  fair  - impulsive, fair safety awareness  Additional Comments:  Pt was pleasant, cooperative       Functional Assessment:   Initial Eval Status  Date: 21 Treatment Status  Date: Short Term/Long Term Goals  Treatment frequency: PRN 1-3 x/week   1-2 weeks   Feeding IND    Per pt/nsg report  NA   Grooming SUP    Able to complete simple tasks after set up w/ SUP for safety while standing at the sink - pt ed for techs to improve safety/safety awareness     Mod I  Standing At The Sink   UB Dressing NT      Mod I   LB Dressing Min A    Unable to don/doff socks w/ cross-legged tech d/t THR/arthritis - pt ed re: Benefits of use of adaptive techs/equip to improve safety, decrease change in position of head    Mod I   Bathing NT    Pt ed re: benefits of use of Shower seat to improve safety w/ bathing    SUP for safety   Toileting NT      Mod I   Bed Mobility  Rolling:  NT  Repositioning:  NT   Supine to Sit:  NT    Sit to Supine:  NT     Pt seated EOB at start and end of session     IND   Functional Transfers Sit to stand:  SUP  Stand to sit:  SUP      SUP for safety - pt quick moving and impulsive despite pt ed to slow pace/change in position for safety, hand placement    Mod I   Functional Mobility SUP    SUP for safety short distances at b/s, in bathroom and in hallway per pt request - pt ed re: Techs to improve safety/safety awareness    Mod I   Balance Sitting:      Static:  IND EOB    Dynamic:  SUP w/ functional ax EOB    Standing:      Static:  SUP    Dynamic:  SUP w/ functional ax/mobility w/o AD       Activity Tolerance Fair(+)       Visual/  Perceptual WFL  Glasses:  no      Hearing WFL  Hearing Aids  No       Hand dominance: Right    UE ROM: RUE:  WNL Throughout      LUE:  AROM Shoulder flex to ~ 90*, Ext WNL, Abduction to ~ 45*;  AAROM Shoulder flex to ~ 120*, Ext WNL, Abduction to ~ 80*    Strength: RUE: grossly 4+/5     LUE: grossly 4/5 proximally - limited by pain, 4+/5 distally     Strength:  WFL Jean UEs    Fine Motor Coordination:  WFL Jean UEs    Sensation:  Denies numbness or tingling Jean UEs  Tone:  WFL Jean UEs  Edema:  None Noted                            Comments: Upon arrival, patient was found seated EOB. He was agreeable to participate in therapeutic ax. His wife was present during session. Received permission from RN prior to engaging pt in OT services. At the end of the session, patient requested to return to sitting EOB.   Call light and phone within reach, all lines and tubes intact. Oriented pt to call bell. Made all appropriate Environmental Modifications to facilitate pt's level of IND and safety. All needs met. Wife Remained at b/s         Overall patient demonstrated decreased independence and safety during completion of ADL/functional transfer/mobility tasks. Pt would benefit from continued skilled OT to increase safety and independence with completion of ADL/IADL tasks for functional independence and quality of life. Treatment:      Provided Skilled SUP/Assist w/ Pt safety, Proper Positioning, ADLs, Functional Transfers and Functional Mobility as noted above, as well as set up and clean up for session. Skilled monitoring of Vitals and pts response to treatment. Consulted RN, Wife    Education:      Provided Pt/Family ed re: Purpose of OT services;  OT Plan of Care;     ADL-  Instruction/training on use of DME/AD/Adaptive equip/techs to improve safety/IND with Functional Ax    Mobility-  Instruction/training on safety and improved independence with bed mobility, functional transfers, functional mobility    Sitting EOB - to improve dynamic sitting balance and activity tolerance during ADLs as noted above   Activity tolerance - Instruction/training on energy conservation/work simplification, techs to increase endurance for completion of Functional Ax    Cognitive retraining -  Cues for safety during Functional Ax for safety, improved safety awareness, sequencing, problem solving   Skilled monitoring of pt's response to tx ax   Techs for improved Safety/Safety Awareness w/ Functional Activity/Mobility   Therapeutic Exercises- Instruction on BUE ROM exercises to improve strength and function of BUE for improved indep with ADLs - w/ focus on L Shoulder - pt ed/demo for table-top AAROM/SROM/AROM;   Instructed pt to perform ROM exercises 3-4x/day as tolerated     Recommendations for Continued Participation in OT services during Hospitalization      Made all appropriate Environmental Modifications to facilitate pt's level of IND and safety. Pt and/or Family verbalized/demonstrated a Good(-) understanding of education provided. Will Review PRN. Assessment of current deficits   Functional mobility [x]  ADLs [x] Strength [x]  Cognition [x]  Functional transfers  [x] IADLs [x] Safety Awareness [x]  Endurance [x]  Fine Motor Coordination [] Balance [x] Vision/perception [] Sensation []   Gross Motor Coordination [] ROM [x] Delirium []                  Motor Control []      Plan of Care: OT 1-3 x/week for 1-2 weeks PRN   [x] ADL retraining/AE, Equipment Needs/Recommendations   [x] Energy Conservation Techniques/Strategies      [] Neuromuscular Re-Education      [x] Functional Transfer Training         [x] Functional Mobility Training          [x] Cognitive Re-Training          [] Splinting/Positioning Needs           [x] Therapeutic Activity   [x]Therapeutic Exercise   [] Visual/Perceptual   [] Delirium Prevention/Treatment   [x] Positioning to Improve Functional Story, Safety, and Skin Integrity   [x] Patient and/or Family Education to Increase Safety and Functional Story   [x] Environmental Modifications  [x] Compensatory techniques for ADLs   [x] Other:       Pt would benefit from continued skilled OT services to increase safety and independence with completion of ADL/IADL tasks for functional independence and quality of life. Pt/Family actively participated in the establishment of goals. Rehab Potential:  Good for established goals    Patient / Family Goal:  Return Home ASAP     Patient and/or Family were instructed on Functional Diagnosis, Prognosis/Goals and OT Plan of Care. Demonstrated Good(-) understanding.       Evaluation Time includes thorough review of current medical information, gathering information on past medical history/social history and prior level of function, completion of standardized testing/informal observation of tasks, assessment of data and education on plan of care and goals.      Eval Complexity: Low  Profile and History - Mod  Assessment of Occupational Performance and Identification of Deficits - low  Clinical Decision Making - Low     Time In:  0941              Time Out:  1004  Total Treatment Time:  10 minutes      Treatment Charges: Mins Units   OT Eval Low 97165 X 1   OT Eval Medium 42501     OT Eval High 87038     OT Re-Eval [id number]     Therapeutic Ex  06982     Therapeutic Activities 22456     ADL/Self Care 95894 10 1   Neuro Re-ed 33274     Orthotic manage/training  10276     Non-Billable Time     Total Timed Treatment 10 1       Osman Harris [place] Janie, OTR/L  # [id number]

## 2021-05-11 NOTE — DISCHARGE SUMMARY
Hospitalist Discharge Summary    Patient ID: Charlie Mendez   Patient : 1940  Patient's PCP: Joseph Gramajo MD    Admit Date: 2021   Admitting Physician: Zachary Vera MD    Discharge Date:  2021   Discharge Physician: Julio Cesar De Santiago MD   Discharge Condition: Stable  Discharge Disposition: [facility] course in brief:  (Please refer to daily progress notes for a comprehensive review of the hospitalization by requesting medical records)    Per Dr. Thomasine Goldmann:    79 y.o. male who presented to [facility] with medical history of BPH, atrial fibrillation status post pacemaker on anticoagulation with Eliquis, right bundle branch block, PVCs, osteoarthritis of the hip, hypertension, GERD, and aortic valve regurgitation. Patient was in his usual state of health until last night when he went to bed. He woke up this morning not feeling well. He did not go discharge with his wife. His wife came back and found him unresponsive in the bed covered in emesis. He was altered, but her mental status was constant, severe, associated with garbled speech, tongue biting and left arm pain. He had a second episode of emesis at home. He denies any prior history of seizure disorder. The pain in his left arm is moderate, achy, on and off and worse with movement. No associated swelling. He has some weakness there. No chest pain or shortness of breath. He has a cough, unsteady on his feet, denies any focal leg weakness. No headache. No dizziness.     Vital signs notable for blood pressure 149/88, labs showed glucose of 130, normal CBC, negative troponin, INR 1.3 and urinalysis that is positive for ketones, blood and protein. EKG showed atrial paced rhythm rate of 77, ST depression in V4 through V6, Q waves in V2 and 3, unchanged from prior. CT scan of the head and chest x-ray were negative.   Last echo was in 2019, EF was 60%, has mild MR/TR and no PFO.     He is being admitted for further management. Hospital Course: Altered mental status/unresponsiveness- Resolved  - Suspected 2/2 seizure  - CT head negative for acute intracranial pathology   - Neuro c/s noted and appreciated; outpatient follow-up. Obtain outpatient MRI in setting of PPM  - EEG showed no seizures or definite epileptiform discharges were noted during this study.   - Seizure precautions were ensured  - No indications to start on empiric AED's  - Counseled on alcohol cessation     Left arm pain  - XR with minimal OA of AC joint  - Outpatient PCP follow-up     Rhabdomyolysis   - CPK [id number]  - Pt was managed with IVF at 100 cc/h  - Encourage PO hydration as outpatient     Superficial laceration of tongue  - Topical care  - Etiology likely trauma during seizure     Paroxysmal atrial fibrillation  - Status post pacemaker  - Continue Eliquis per anticoagulation  - Cont rate controlled with metoprolol     Hypertension  - Cont home medication    Pt seen and examined at bedside in NAD. Pt denies any chest pina, palpitations or SOB. Pt denies any further episodes of seizure like activity or LOC, headaches, tinnitus, diplopia, changes in smell or taste. Pt is hemodynamically stable for discharge.     Consults:   IP CONSULT TO PRIMARY CARE PROVIDER  IP CONSULT TO NEUROLOGY    Discharge Diagnoses:  AMS    Discharge Instructions / Follow up:    Future Appointments   Date Time Provider Mayte Tse   7/21/2021  8:00 AM Corliss Frankel, MD TRA[facility]   11/24/2021  9:30 AM Corliss Frankel, MD TRAIL [facility]       Continued appropriate risk factor modification of blood pressure, diabetes and serum lipids will remain essential to reducing risk of future atherosclerotic development    Activity: activity as tolerated    Significant labs:  CBC:   Recent Labs     05/09/21  1551 05/10/21  0239 05/11/21  0424   WBC 11.5 10.3 6.0   RBC 4.76 4.58 4.04   HGB 15.3 14.8 13.0   HCT 43.6 42.2 37.7   MCV 91.6 92.1 93.3   RDW 14.0 14.3 14.1   PLT or \"stroke alert\" been called? ->No Reason for exam:->mental status change LKW 0800 am today Decision Support Exception - unselect if not a suspected or confirmed emergency medical condition->Emergency Medical Condition (MA) What reading provider will be dictating this exam?->CRC FINDINGS: No evidence of acute intracranial hemorrhage or edema. No abnormal extra-axial fluid collections. There is prominence of sulci, cisterns and ventricles related age-appropriate parenchymal volume loss. Areas of hypoattenuation are seen in periventricular and subcortical white matter are suggestive of areas of chronic microvascular ischemia. No evidence of depressed calvarial fracture. No acute intracranial hemorrhage or edema. Xr Shoulder Left (min 2 Views)    Result Date: 5/10/2021  EXAMINATION: THREE XRAY VIEWS OF THE LEFT SHOULDER 5/10/2021 9:20 am COMPARISON: None. HISTORY: ORDERING SYSTEM PROVIDED HISTORY: Left arm pain TECHNOLOGIST PROVIDED HISTORY: Reason for exam:->Left arm pain What reading provider will be dictating this exam?->CRC FINDINGS: Minimal osteoarthritis at the [facility] joint. No fracture or dislocation. Normal soft tissues. Minimal osteoarthritis at the [facility] joint.        Discharge Medications:      Medication List      CONTINUE taking these medications    apixaban 5 MG Tabs tablet  Commonly known as: ELIQUIS  Take 1 tablet by mouth 2 times daily     hydroCHLOROthiazide 12.5 MG capsule  Commonly known as: MICROZIDE  Take 1 capsule by mouth daily as needed (edema of legs)     losartan 50 MG tablet  Commonly known as: COZAAR     metoprolol succinate 50 MG extended release tablet  Commonly known as: TOPROL XL     omeprazole 40 MG delayed release capsule  Commonly known as: PRILOSEC  Take 1 capsule by mouth daily            Time Spent on discharge is more than 45 minutes in the examination, evaluation, counseling and review of medications and discharge plan.    +++++++++++++++++++++++++++++++++++++++++++++++++  Ector Torres MD  [address], [place]  +++++++++++++++++++++++++++++++++++++++++++++++++  NOTE: This report was transcribed using voice recognition software. Every effort was made to ensure accuracy; however, inadvertent computerized transcription errors may be present.

## 2021-05-13 ENCOUNTER — OFFICE VISIT (OUTPATIENT)
Dept: PRIMARY CARE CLINIC | Age: 81
End: 2021-05-13
Payer: MEDICARE

## 2021-05-13 VITALS
SYSTOLIC BLOOD PRESSURE: 120 MMHG | RESPIRATION RATE: 18 BRPM | DIASTOLIC BLOOD PRESSURE: 80 MMHG | OXYGEN SATURATION: 95 % | HEART RATE: 68 BPM | TEMPERATURE: 97.9 F | BODY MASS INDEX: 27.36 KG/M2 | WEIGHT: 202 LBS | HEIGHT: 72 IN

## 2021-05-13 DIAGNOSIS — I10 ESSENTIAL HYPERTENSION: ICD-10-CM

## 2021-05-13 DIAGNOSIS — Z86.79 HISTORY OF ATRIAL FIBRILLATION: ICD-10-CM

## 2021-05-13 DIAGNOSIS — G40.409 GRAND MAL SEIZURE DISORDER (HCC): Primary | ICD-10-CM

## 2021-05-13 DIAGNOSIS — Z95.0 PACEMAKER: ICD-10-CM

## 2021-05-13 DIAGNOSIS — M79.602 LEFT ARM PAIN: ICD-10-CM

## 2021-05-13 DIAGNOSIS — S01.512A TONGUE LACERATION, INITIAL ENCOUNTER: ICD-10-CM

## 2021-05-13 PROBLEM — R41.82 AMS (ALTERED MENTAL STATUS): Status: RESOLVED | Noted: 2021-05-09 | Resolved: 2021-05-13

## 2021-05-13 PROCEDURE — 1036F TOBACCO NON-USER: CPT | Performed by: INTERNAL MEDICINE

## 2021-05-13 PROCEDURE — 99214 OFFICE O/P EST MOD 30 MIN: CPT | Performed by: INTERNAL MEDICINE

## 2021-05-13 PROCEDURE — 4040F PNEUMOC VAC/ADMIN/RCVD: CPT | Performed by: INTERNAL MEDICINE

## 2021-05-13 PROCEDURE — G8427 DOCREV CUR MEDS BY ELIG CLIN: HCPCS | Performed by: INTERNAL MEDICINE

## 2021-05-13 PROCEDURE — G8417 CALC BMI ABV UP PARAM F/U: HCPCS | Performed by: INTERNAL MEDICINE

## 2021-05-13 PROCEDURE — 1123F ACP DISCUSS/DSCN MKR DOCD: CPT | Performed by: INTERNAL MEDICINE

## 2021-05-13 NOTE — PROGRESS NOTES
Ko Bennett  5/13/21     Chief Complaint   Patient presents with    Seizures     5/9/21 hospital follow up         Allergies   Allergen Reactions    Chicken Allergy     Shellfish-Derived Products     Turkey-Sweet Potatoes-Peaches [Alimentum]         Current Outpatient Medications   Medication Sig Dispense Refill    omeprazole (PRILOSEC) 40 MG delayed release capsule Take 1 capsule by mouth daily 90 capsule 3    metoprolol succinate (TOPROL XL) 50 MG extended release tablet TAKE 1 TABLET BY MOUTH ONCE DAILY      losartan (COZAAR) 50 MG tablet Take 50 mg by mouth daily      apixaban (ELIQUIS) 5 MG TABS tablet Take 1 tablet by mouth 2 times daily 60 tablet 5    hydroCHLOROthiazide (MICROZIDE) 12.5 MG capsule Take 1 capsule by mouth daily as needed (edema of legs) (Patient not taking: Reported on 5/13/2021) 30 capsule 1     No current facility-administered medications for this visit. HPI: Patient comes in for hospital follow-up visit. He was admitted on 5/9/2021 following a presumed grand mal seizure at home. It was not witnessed but his wife found him confused covered with emesis and with a lacerated tongue. He was taken to [address] [place] [address] emergency room. He also complained of left arm pain. His total CK was significantly elevated probably due to the seizure activity and then gradually normalized. He had a CAT scan of the brain which did not reveal any acute pathology. He was unable to do an MRI because of his permanent pacemaker in situ. He had no further seizures. He was evaluated by neurology. EEG did not reveal any definite seizure activity. He was not started on any medication and was advised to follow-up with neurology as an outpatient. He was instructed not to drive. Since he was discharged from the hospital he feels well. He has not had any further seizures or episodes of altered mental status. He denies any headache or dizziness.   He still has some left arm discomfort. He has been avoiding alcohol. The edema which he was complaining of last visit has resolved with just a few doses of HCTZ 12.5 mg.  Iram on all of his usual meds and supplements the same as listed on his med list.  Neurology requested an MRI be done if possible if one could be found that was safe to do with his pacemaker. Review of Systems: as per HPI      Physical Exam:    Patient is a 79 y.o. male. Patient appears to be in no distress. Breathing comfortably. Ambulates without assistance. HEENT: normal.  Tongue laceration noted. Neck supple, no adenopathy or bruits. Heart RR, no MGR. Lungs clear. Abd: normal  Ext: no edema. Peripheral pulses: normal.  No neurologic deficits noted. Assessment:    Cheryl Bowman was seen today for seizures. 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 History of atrial fibrillation    Essential hypertension    Permanent pacemaker in situ      Discussion Notes: He will continue all of his usual meds and supplements the same as listed on his med list.  He is instructed not to drive and his wife is present during the exam and all questions were answered. We are going to try to locate an MRI that can be safely utilized with his pacemaker. He has an appointment to follow-up with neurology in a few weeks. His wife will call in the meantime if he has any further problems. If he has any recurring seizures he should return to [address] [place] [address] emergency room.

## 2021-06-02 ENCOUNTER — OFFICE VISIT (OUTPATIENT)
Dept: PRIMARY CARE CLINIC | Age: 81
End: 2021-06-02
Payer: MEDICARE

## 2021-06-02 VITALS
OXYGEN SATURATION: 97 % | TEMPERATURE: 97.4 F | HEART RATE: 75 BPM | HEIGHT: 72 IN | BODY MASS INDEX: 27.63 KG/M2 | WEIGHT: 204 LBS | DIASTOLIC BLOOD PRESSURE: 80 MMHG | SYSTOLIC BLOOD PRESSURE: 120 MMHG | RESPIRATION RATE: 18 BRPM

## 2021-06-02 DIAGNOSIS — R39.11 BENIGN PROSTATIC HYPERPLASIA WITH URINARY HESITANCY: ICD-10-CM

## 2021-06-02 DIAGNOSIS — N40.1 BENIGN PROSTATIC HYPERPLASIA WITH URINARY HESITANCY: ICD-10-CM

## 2021-06-02 DIAGNOSIS — I10 ESSENTIAL HYPERTENSION: ICD-10-CM

## 2021-06-02 DIAGNOSIS — M79.622 LEFT UPPER ARM PAIN: Primary | ICD-10-CM

## 2021-06-02 DIAGNOSIS — G40.409 GRAND MAL SEIZURE (HCC): ICD-10-CM

## 2021-06-02 PROCEDURE — G8417 CALC BMI ABV UP PARAM F/U: HCPCS | Performed by: INTERNAL MEDICINE

## 2021-06-02 PROCEDURE — 99214 OFFICE O/P EST MOD 30 MIN: CPT | Performed by: INTERNAL MEDICINE

## 2021-06-02 PROCEDURE — G8427 DOCREV CUR MEDS BY ELIG CLIN: HCPCS | Performed by: INTERNAL MEDICINE

## 2021-06-02 PROCEDURE — 4040F PNEUMOC VAC/ADMIN/RCVD: CPT | Performed by: INTERNAL MEDICINE

## 2021-06-02 PROCEDURE — 1123F ACP DISCUSS/DSCN MKR DOCD: CPT | Performed by: INTERNAL MEDICINE

## 2021-06-02 PROCEDURE — 1036F TOBACCO NON-USER: CPT | Performed by: INTERNAL MEDICINE

## 2021-06-02 RX ORDER — LOSARTAN POTASSIUM 50 MG/1
50 TABLET ORAL DAILY
Qty: 90 TABLET | Refills: 3 | Status: SHIPPED
Start: 2021-06-02 | End: 2022-06-15 | Stop reason: SDUPTHER

## 2021-06-02 SDOH — ECONOMIC STABILITY: FOOD INSECURITY: WITHIN THE PAST 12 MONTHS, YOU WORRIED THAT YOUR FOOD WOULD RUN OUT BEFORE YOU GOT MONEY TO BUY MORE.: NEVER TRUE

## 2021-06-02 SDOH — ECONOMIC STABILITY: FOOD INSECURITY: WITHIN THE PAST 12 MONTHS, THE FOOD YOU BOUGHT JUST DIDN'T LAST AND YOU DIDN'T HAVE MONEY TO GET MORE.: NEVER TRUE

## 2021-06-02 ASSESSMENT — SOCIAL DETERMINANTS OF HEALTH (SDOH): HOW HARD IS IT FOR YOU TO PAY FOR THE VERY BASICS LIKE FOOD, HOUSING, MEDICAL CARE, AND HEATING?: NOT HARD AT ALL

## 2021-06-02 NOTE — PROGRESS NOTES
Domonique Bailee  6/2/21     Chief Complaint   Patient presents with    Seizures     follow up         Allergies   Allergen Reactions    Chicken Allergy     Shellfish-Derived Products     Turkey-Sweet Potatoes-Peaches [Alimentum]         Current Outpatient Medications   Medication Sig Dispense Refill    losartan (COZAAR) 50 MG tablet Take 1 tablet by mouth daily 90 tablet 3    omeprazole (PRILOSEC) 40 MG delayed release capsule Take 1 capsule by mouth daily 90 capsule 3    metoprolol succinate (TOPROL XL) 50 MG extended release tablet TAKE 1 TABLET BY MOUTH ONCE DAILY      apixaban (ELIQUIS) 5 MG TABS tablet Take 1 tablet by mouth 2 times daily 60 tablet 5     No current facility-administered medications for this visit. HPI: Patient comes in for follow-up visit. He has not had any further seizures since last visit. He is still not driving. He just saw his urologist regarding his BPH and elevated PSA and is going to be continuing with observation for that problem. He has an appointment to see a neurologist on 6/11/2021 for further evaluation and recommendations regarding his recent grand mal seizure. He continues to complain of left upper arm pain which prevents him from doing certain movements such as swinging a golf club. The pain is been present since his grand mal seizure. He has been unable to do an MRI of the brain due to his permanent pacemaker in situ. He has not had any further problems with edema since last visit. He denies any chest pain or shortness of breath. He remains on all his usual meds and supplements the same as listed on his med list, which was reviewed with him and his wife who was present. Review of Systems: as per HPI      Physical Exam:    Patient is a 79 y.o. male. Patient appears to be in no distress. Breathing comfortably. Ambulates without assistance. HEENT: normal.  Neck supple, no adenopathy or bruits. Heart RR, no MGR. Lungs clear.   Abd: normal  Ext: no

## 2021-06-11 ENCOUNTER — OFFICE VISIT (OUTPATIENT)
Dept: NEUROLOGY | Age: 81
End: 2021-06-11
Payer: MEDICARE

## 2021-06-11 VITALS
WEIGHT: 204 LBS | DIASTOLIC BLOOD PRESSURE: 60 MMHG | BODY MASS INDEX: 27.63 KG/M2 | SYSTOLIC BLOOD PRESSURE: 160 MMHG | HEIGHT: 72 IN

## 2021-06-11 DIAGNOSIS — R56.9 GENERALIZED SEIZURE (HCC): Primary | ICD-10-CM

## 2021-06-11 PROCEDURE — 4040F PNEUMOC VAC/ADMIN/RCVD: CPT | Performed by: PSYCHIATRY & NEUROLOGY

## 2021-06-11 PROCEDURE — 1123F ACP DISCUSS/DSCN MKR DOCD: CPT | Performed by: PSYCHIATRY & NEUROLOGY

## 2021-06-11 PROCEDURE — 1036F TOBACCO NON-USER: CPT | Performed by: PSYCHIATRY & NEUROLOGY

## 2021-06-11 PROCEDURE — G8427 DOCREV CUR MEDS BY ELIG CLIN: HCPCS | Performed by: PSYCHIATRY & NEUROLOGY

## 2021-06-11 PROCEDURE — 99214 OFFICE O/P EST MOD 30 MIN: CPT | Performed by: PSYCHIATRY & NEUROLOGY

## 2021-06-11 PROCEDURE — G8417 CALC BMI ABV UP PARAM F/U: HCPCS | Performed by: PSYCHIATRY & NEUROLOGY

## 2021-06-11 RX ORDER — LEVETIRACETAM 500 MG/1
500 TABLET ORAL 2 TIMES DAILY
Qty: 60 TABLET | Refills: 5 | Status: SHIPPED
Start: 2021-06-11 | End: 2021-12-13

## 2021-06-11 RX ORDER — TAMSULOSIN HYDROCHLORIDE 0.4 MG/1
0.4 CAPSULE ORAL DAILY
COMMUNITY

## 2021-06-11 ASSESSMENT — ENCOUNTER SYMPTOMS
EYES NEGATIVE: 1
GASTROINTESTINAL NEGATIVE: 1
ALLERGIC/IMMUNOLOGIC NEGATIVE: 1

## 2021-06-11 NOTE — PROGRESS NOTES
Neurology Consult Note:    Patient: Martina Esquivel  : 1940  Date: 21  Referring provider: Aries Romano MD      Referral to Neurology: New onset generalized seizure; history of atrial fibrillation status post ablation and pacemaker on Eliquis, aortic valve replacement, right bundle branch block and history of hypertension. Cc: New onset generalized seizure. Dear Aries Romano MD:     Thank you for your referral of Martina Esquivel to the Neurology clinic, an alert 43-year-old man with history of atrial fibrillation status post ablation, pacemaker, aortic valve replacement on Eliquis, right bundle branch block, and history of hypertension who presented on May 9 accompanied by his wife as he was found unresponsive in bed covered in emesis. He had altered mental status, garbled speech, and had bitten his tongue. His wife explains she did not actually see a tonic-clonic type seizure with body stiffening, tremoring or jerking activity but found him in bed confused and covered with emesis. She describes that he was \"speaking gibberish\" and with confusion lasted for several hours. He has no memory of the event. There is no prior history of seizure or epilepsy. He had recently returned from a 5-day golfing trip to [place] and had bilateral leg swelling attributed to drinking more alcohol and eating more salt. Neurohospitalist consult reviewed of Dr. Hayes Nath, 5/10/2021. Paroxysmal atrial fibrillation status post pacemaker on anticoagulation with Eliquis, altered mental status secondary to seizure versus cardiac embolic event. Opted not to start AED since seizure was a first-time event    Lab Data: Reviewed from 5/, elevated CK levels trending downwards, 495, 665. Imaging Data: Head CT scan without contrast, 2021, reviewed: No acute intracranial process.     Current Outpatient Medications   Medication Sig Dispense Refill    tamsulosin (FLOMAX) 0.4 MG capsule Take 0.4 mg by mouth daily      losartan (COZAAR) 50 MG tablet Take 1 tablet by mouth daily 90 tablet 3    omeprazole (PRILOSEC) 40 MG delayed release capsule Take 1 capsule by mouth daily 90 capsule 3    metoprolol succinate (TOPROL XL) 50 MG extended release tablet TAKE 1 TABLET BY MOUTH ONCE DAILY      apixaban (ELIQUIS) 5 MG TABS tablet Take 1 tablet by mouth 2 times daily 60 tablet 5     No current facility-administered medications for this visit.        Allergies   Allergen Reactions    Chicken Allergy     Shellfish-Derived Products     Turkey-Sweet Potatoes-Peaches [Alimentum]        Patient Active Problem List   Diagnosis    Asymptomatic PVCs    RBBB (right bundle branch block with left anterior fascicular block)    Paroxysmal atrial fibrillation (HCC)    Benign prostatic hyperplasia with urinary hesitancy    History of atrial fibrillation    Anticoagulated    Primary osteoarthritis of right hip    Left arm pain    Tongue laceration, initial encounter    Essential hypertension    Pacemaker    Grand mal seizure [facility])       Past Medical History:   Diagnosis Date    Aortic valve regurgitation     Arrhythmia     Atrial fibrillation (HCC)     Dizziness     GERD (gastroesophageal reflux disease)     Heartburn     History of stress test     Hypertension     Lightheadedness        Past Surgical History:   Procedure Laterality Date    OTHER SURGICAL HISTORY      Ablation Cardiac    PACEMAKER PLACEMENT      TONSILLECTOMY         Family History   Problem Relation Age of Onset    Other Mother         brain aneurysm    Stroke Father     Other Father         pacemaker    Cancer Sister        Social History     Socioeconomic History    Marital status:      Spouse name: Not on file    Number of children: Not on file    Years of education: Not on file    Highest education level: Not on file   Occupational History    Not on file   Tobacco Use    Smoking status: Never Smoker    Smokeless tobacco: Never Used   Vaping Use    Vaping Use: Never used   Substance and Sexual Activity    Alcohol use: Yes     Alcohol/week: 2.0 standard drinks     Types: 1 Cans of beer, 1 Shots of liquor per week     Comment: 2 drinks 5 days a week    Drug use: No    Sexual activity: Not on file   Other Topics Concern    Not on file   Social History Narrative    Not on file     Social Determinants of Health     Financial Resource Strain: Low Risk     Difficulty of Paying Living Expenses: Not hard at all   Food Insecurity: No Food Insecurity    Worried About Running Out of Food in the Last Year: Never true    Artie of Food in the Last Year: Never true   Transportation Needs:     Lack of Transportation (Medical):  Lack of Transportation (Non-Medical):    Physical Activity:     Days of Exercise per Week:     Minutes of Exercise per Session:    Stress:     Feeling of Stress :    Social Connections:     Frequency of Communication with Friends and Family:     Frequency of Social Gatherings with Friends and Family:     Attends Jewish Services:     Active Member of Clubs or Organizations:     Attends Club or Organization Meetings:     Marital Status:    Intimate Partner Violence:     Fear of Current or Ex-Partner:     Emotionally Abused:     Physically Abused:     Sexually Abused:      Review of Systems   Constitutional: Negative. HENT: Negative. Eyes: Negative. Cardiovascular:        Hx PM, Afib, AVR   Gastrointestinal: Negative. Endocrine: Negative. Genitourinary: Negative. Musculoskeletal: Negative. Skin: Negative. Allergic/Immunologic: Negative. Neurological: Positive for seizures. New onset generalized seizure associated with tongue biting and altered mental status   Hematological: Negative. Psychiatric/Behavioral: The patient is nervous/anxious. All other systems reviewed and are negative.     Neurologic Exam:  BP (!) 160/60 (Site: Right Upper Arm, Position: discussed, starting 500 mg twice daily for minimum 6 months and restriction of driving for 3 to 6 months was also discussed. 3.  Follow-up in the Neurology clinic in 3 months. 4.  He was provided patient information from the Epilepsy [employer] website. Sincerely,      Carolina Faith MD    This note was created using speech recognition transcription software. Despite proofreading, there may be several typographical errors present that may affect the meaning of the content. Please call with any questions. Note: A total time of 40 mins. was spent on the date of service in preparation for this visit, which included face-to-face patient care and completing clinical documentation, and including counseling and coordination of care based on clinical impression, neurologic diagnosis, review of pertinent imaging studies, test results, implementation and discussion of treatment plan, risk factor reduction and patient and/or family education.

## 2021-06-15 ENCOUNTER — OFFICE VISIT (OUTPATIENT)
Dept: PRIMARY CARE CLINIC | Age: 81
End: 2021-06-15
Payer: MEDICARE

## 2021-06-15 VITALS
RESPIRATION RATE: 18 BRPM | BODY MASS INDEX: 27.9 KG/M2 | SYSTOLIC BLOOD PRESSURE: 128 MMHG | TEMPERATURE: 98.6 F | DIASTOLIC BLOOD PRESSURE: 78 MMHG | HEART RATE: 72 BPM | WEIGHT: 206 LBS | OXYGEN SATURATION: 98 % | HEIGHT: 72 IN

## 2021-06-15 DIAGNOSIS — M79.602 LEFT ARM PAIN: ICD-10-CM

## 2021-06-15 DIAGNOSIS — Z86.79 HISTORY OF ATRIAL FIBRILLATION: ICD-10-CM

## 2021-06-15 DIAGNOSIS — G40.409 GRAND MAL SEIZURE DISORDER (HCC): Primary | ICD-10-CM

## 2021-06-15 DIAGNOSIS — I10 ESSENTIAL HYPERTENSION: ICD-10-CM

## 2021-06-15 DIAGNOSIS — Z95.0 PACEMAKER: ICD-10-CM

## 2021-06-15 PROBLEM — S01.512A TONGUE LACERATION, INITIAL ENCOUNTER: Status: RESOLVED | Noted: 2021-05-10 | Resolved: 2021-06-15

## 2021-06-15 PROCEDURE — 99214 OFFICE O/P EST MOD 30 MIN: CPT | Performed by: INTERNAL MEDICINE

## 2021-06-15 PROCEDURE — 4040F PNEUMOC VAC/ADMIN/RCVD: CPT | Performed by: INTERNAL MEDICINE

## 2021-06-15 PROCEDURE — G8427 DOCREV CUR MEDS BY ELIG CLIN: HCPCS | Performed by: INTERNAL MEDICINE

## 2021-06-15 PROCEDURE — G8417 CALC BMI ABV UP PARAM F/U: HCPCS | Performed by: INTERNAL MEDICINE

## 2021-06-15 PROCEDURE — 1036F TOBACCO NON-USER: CPT | Performed by: INTERNAL MEDICINE

## 2021-06-15 PROCEDURE — 1123F ACP DISCUSS/DSCN MKR DOCD: CPT | Performed by: INTERNAL MEDICINE

## 2021-06-15 NOTE — PROGRESS NOTES
Michelle Hashimoto  6/15/21     Chief Complaint   Patient presents with    Seizures     dicuss dr Ebony Garica notes         Allergies   Allergen Reactions    Chicken Allergy     Shellfish-Derived Products     Turkey-Sweet Potatoes-Peaches [Alimentum]         Current Outpatient Medications   Medication Sig Dispense Refill    tamsulosin (FLOMAX) 0.4 MG capsule Take 0.4 mg by mouth daily      losartan (COZAAR) 50 MG tablet Take 1 tablet by mouth daily 90 tablet 3    omeprazole (PRILOSEC) 40 MG delayed release capsule Take 1 capsule by mouth daily 90 capsule 3    metoprolol succinate (TOPROL XL) 50 MG extended release tablet TAKE 1 TABLET BY MOUTH ONCE DAILY      apixaban (ELIQUIS) 5 MG TABS tablet Take 1 tablet by mouth 2 times daily 60 tablet 5    levETIRAcetam (KEPPRA) 500 MG tablet Take 1 tablet by mouth 2 times daily (Patient not taking: Reported on 6/15/2021) 60 tablet 5     No current facility-administered medications for this visit. HPI: Patient comes in for follow-up visit. He recently saw a neurologist, Dr. Anthony Rockwell, for his seizure disorder. He has not had any further seizures since his initial 1 last month. His neurologic evaluation was unremarkable. She started him on Keppra 500 mg twice daily which he has yet to start. He remains on his other meds and supplements the same as listed on his med list.  He follows up with his cardiologist for his history of atrial fibrillation and permanent pacemaker. He remains on anticoagulation with Eliquis and is denied any bleeding issues. He follows up with his urologist for management of his symptomatic BPH and elevated PSA. Review of Systems: as per HPI      Physical Exam:    Patient is a 79 y.o. male. Patient appears to be in no distress. Breathing comfortably. Ambulates without assistance. HEENT: normal.  Neck supple, no adenopathy or bruits. Heart RR, no MGR. Lungs clear. Abd: normal  Ext: no edema.  Peripheral pulses: normal.  No neurologic

## 2021-07-19 DIAGNOSIS — R73.03 PREDIABETES: ICD-10-CM

## 2021-07-19 DIAGNOSIS — E78.1 PURE HYPERTRIGLYCERIDEMIA: ICD-10-CM

## 2021-07-19 DIAGNOSIS — G40.409 GRAND MAL SEIZURE DISORDER (HCC): ICD-10-CM

## 2021-07-19 DIAGNOSIS — I10 ESSENTIAL HYPERTENSION: ICD-10-CM

## 2021-07-19 LAB
ALBUMIN SERPL-MCNC: 3.9 G/DL (ref 3.5–5.2)
ALP BLD-CCNC: 55 U/L (ref 40–129)
ALT SERPL-CCNC: 16 U/L (ref 0–40)
ANION GAP SERPL CALCULATED.3IONS-SCNC: 10 MMOL/L (ref 7–16)
AST SERPL-CCNC: 19 U/L (ref 0–39)
BASOPHILS ABSOLUTE: 0.02 E9/L (ref 0–0.2)
BASOPHILS RELATIVE PERCENT: 0.3 % (ref 0–2)
BILIRUB SERPL-MCNC: 0.7 MG/DL (ref 0–1.2)
BUN BLDV-MCNC: 14 MG/DL (ref 6–23)
CALCIUM SERPL-MCNC: 9.1 MG/DL (ref 8.6–10.2)
CHLORIDE BLD-SCNC: 106 MMOL/L (ref 98–107)
CHOLESTEROL, TOTAL: 157 MG/DL (ref 0–199)
CO2: 26 MMOL/L (ref 22–29)
CREAT SERPL-MCNC: 1.2 MG/DL (ref 0.7–1.2)
EOSINOPHILS ABSOLUTE: 0.12 E9/L (ref 0.05–0.5)
EOSINOPHILS RELATIVE PERCENT: 1.9 % (ref 0–6)
GFR AFRICAN AMERICAN: >60
GFR NON-AFRICAN AMERICAN: 58 ML/MIN/1.73
GLUCOSE BLD-MCNC: 84 MG/DL (ref 74–99)
HBA1C MFR BLD: 4.7 % (ref 4–5.6)
HCT VFR BLD CALC: 45.3 % (ref 37–54)
HDLC SERPL-MCNC: 32 MG/DL
HEMOGLOBIN: 15 G/DL (ref 12.5–16.5)
IMMATURE GRANULOCYTES #: 0.03 E9/L
IMMATURE GRANULOCYTES %: 0.5 % (ref 0–5)
LDL CHOLESTEROL CALCULATED: 94 MG/DL (ref 0–99)
LYMPHOCYTES ABSOLUTE: 1.45 E9/L (ref 1.5–4)
LYMPHOCYTES RELATIVE PERCENT: 23.3 % (ref 20–42)
MCH RBC QN AUTO: 31.4 PG (ref 26–35)
MCHC RBC AUTO-ENTMCNC: 33.1 % (ref 32–34.5)
MCV RBC AUTO: 95 FL (ref 80–99.9)
MONOCYTES ABSOLUTE: 0.77 E9/L (ref 0.1–0.95)
MONOCYTES RELATIVE PERCENT: 12.4 % (ref 2–12)
NEUTROPHILS ABSOLUTE: 3.84 E9/L (ref 1.8–7.3)
NEUTROPHILS RELATIVE PERCENT: 61.6 % (ref 43–80)
PDW BLD-RTO: 13.9 FL (ref 11.5–15)
PLATELET # BLD: 146 E9/L (ref 130–450)
PMV BLD AUTO: 9 FL (ref 7–12)
POTASSIUM SERPL-SCNC: 4.5 MMOL/L (ref 3.5–5)
RBC # BLD: 4.77 E12/L (ref 3.8–5.8)
SODIUM BLD-SCNC: 142 MMOL/L (ref 132–146)
TOTAL PROTEIN: 6.6 G/DL (ref 6.4–8.3)
TRIGL SERPL-MCNC: 156 MG/DL (ref 0–149)
VLDLC SERPL CALC-MCNC: 31 MG/DL
WBC # BLD: 6.2 E9/L (ref 4.5–11.5)

## 2021-07-21 ENCOUNTER — OFFICE VISIT (OUTPATIENT)
Dept: PRIMARY CARE CLINIC | Age: 81
End: 2021-07-21
Payer: MEDICARE

## 2021-07-21 VITALS
HEART RATE: 84 BPM | DIASTOLIC BLOOD PRESSURE: 76 MMHG | SYSTOLIC BLOOD PRESSURE: 126 MMHG | BODY MASS INDEX: 28.17 KG/M2 | RESPIRATION RATE: 18 BRPM | OXYGEN SATURATION: 95 % | TEMPERATURE: 97.8 F | HEIGHT: 72 IN | WEIGHT: 208 LBS

## 2021-07-21 DIAGNOSIS — G40.409 GRAND MAL SEIZURE DISORDER (HCC): Primary | ICD-10-CM

## 2021-07-21 DIAGNOSIS — I10 ESSENTIAL HYPERTENSION: ICD-10-CM

## 2021-07-21 DIAGNOSIS — S49.92XD INJURY OF LEFT UPPER ARM, SUBSEQUENT ENCOUNTER: ICD-10-CM

## 2021-07-21 DIAGNOSIS — Z95.0 CARDIAC PACEMAKER IN SITU: ICD-10-CM

## 2021-07-21 DIAGNOSIS — Z79.01 ANTICOAGULATED: ICD-10-CM

## 2021-07-21 DIAGNOSIS — Z86.79 HISTORY OF ATRIAL FIBRILLATION: ICD-10-CM

## 2021-07-21 DIAGNOSIS — E78.1 PURE HYPERTRIGLYCERIDEMIA: ICD-10-CM

## 2021-07-21 PROBLEM — S49.92XA LEFT UPPER ARM INJURY: Status: ACTIVE | Noted: 2021-07-21

## 2021-07-21 PROBLEM — M79.602 LEFT ARM PAIN: Status: RESOLVED | Noted: 2021-05-10 | Resolved: 2021-07-21

## 2021-07-21 PROCEDURE — 1123F ACP DISCUSS/DSCN MKR DOCD: CPT | Performed by: INTERNAL MEDICINE

## 2021-07-21 PROCEDURE — G8417 CALC BMI ABV UP PARAM F/U: HCPCS | Performed by: INTERNAL MEDICINE

## 2021-07-21 PROCEDURE — G8427 DOCREV CUR MEDS BY ELIG CLIN: HCPCS | Performed by: INTERNAL MEDICINE

## 2021-07-21 PROCEDURE — 99214 OFFICE O/P EST MOD 30 MIN: CPT | Performed by: INTERNAL MEDICINE

## 2021-07-21 PROCEDURE — 1036F TOBACCO NON-USER: CPT | Performed by: INTERNAL MEDICINE

## 2021-07-21 PROCEDURE — 4040F PNEUMOC VAC/ADMIN/RCVD: CPT | Performed by: INTERNAL MEDICINE

## 2021-07-21 NOTE — PROGRESS NOTES
Mathilda Duverney  7/21/21     Chief Complaint   Patient presents with    Hypertension     6 month check up w labs         Allergies   Allergen Reactions    Chicken Allergy     Shellfish-Derived Products     Turkey-Sweet Potatoes-Peaches [Alimentum]         Current Outpatient Medications   Medication Sig Dispense Refill    tamsulosin (FLOMAX) 0.4 MG capsule Take 0.4 mg by mouth daily      levETIRAcetam (KEPPRA) 500 MG tablet Take 1 tablet by mouth 2 times daily 60 tablet 5    losartan (COZAAR) 50 MG tablet Take 1 tablet by mouth daily 90 tablet 3    omeprazole (PRILOSEC) 40 MG delayed release capsule Take 1 capsule by mouth daily 90 capsule 3    metoprolol succinate (TOPROL XL) 50 MG extended release tablet TAKE 1 TABLET BY MOUTH ONCE DAILY      apixaban (ELIQUIS) 5 MG TABS tablet Take 1 tablet by mouth 2 times daily 60 tablet 5     No current facility-administered medications for this visit. HPI: Patient comes in for follow-up visit. Currently is feeling well except for persistent left upper arm pain which does restrict his activities such as golfing. He is doing physical therapy and will be following up with his orthopedic specialist following that. He also follows up with his neurologist for his seizure disorder which has been stable on Keppra 500 mg twice daily. He has been unable to get a MRI of his brain or left shoulder/arm because of his pacemaker. He states that he spoke to his cardiologist and he feels that with his type of pacemaker he should be able to have the MRI. He states his cardiologist was going to check with the radiologist to see if that would be okay. If so he will check with his neurologist and orthopedic surgeon to see if they would like to order MRIs of the brain and left shoulder and arm. He has been unable to golf and is not getting any significant exercise. He denies any chest pain or shortness of breath.   He remains on his usual meds and supplements the same as listed on his med list, which was reviewed with him. Review of Systems: as per HPI      Physical Exam:    Patient is a 79 y.o. male. Patient appears to be in no distress. Breathing comfortably. Ambulates without assistance. HEENT: normal.  Neck supple, no adenopathy or bruits. Heart RR, no MGR. Lungs clear. Abd: normal  Ext: no edema. Peripheral pulses: normal.  No neurologic deficits noted. Lab Results   Component Value Date    WBC 6.2 07/19/2021    HGB 15.0 07/19/2021    HCT 45.3 07/19/2021     07/19/2021    CHOL 157 07/19/2021    TRIG 156 (H) 07/19/2021    HDL 32 07/19/2021    ALT 16 07/19/2021    AST 19 07/19/2021    TSH 0.805 01/26/2021    PSA 7.02 (H) 06/06/2018    INR 1.3 05/09/2021    LABA1C 4.7 07/19/2021      Lab Results   Component Value Date     07/19/2021    K 4.5 07/19/2021     07/19/2021    CO2 26 07/19/2021    BUN 14 07/19/2021    CREATININE 1.2 07/19/2021    GLUCOSE 84 07/19/2021    CALCIUM 9.1 07/19/2021    PROT 6.6 07/19/2021    LABALBU 3.9 07/19/2021    BILITOT 0.7 07/19/2021    ALKPHOS 55 07/19/2021    AST 19 07/19/2021    ALT 16 07/19/2021    LABGLOM 58 07/19/2021    GFRAA >60 07/19/2021            Assessment:    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 -     CBC Auto Differential; Future    Pure hypertriglyceridemia, well controlled on no meds. -     Comprehensive Metabolic Panel; Future  -     Lipid Panel; Future  -     TSH without Reflex; Future    Essential hypertension, controlled on current meds. -     CBC Auto Differential; Future  -     Comprehensive Metabolic Panel; Future  -     CBC Auto Differential; Future    Injury of left upper arm, subsequent encounter, currently doing physical therapy and will be following up with his orthopedic surgeon. History of atrial fibrillation, currently maintained in sinus rhythm following ablation procedure.     Anticoagulated on Eliquis with no reported bleeding issues. Cardiac pacemaker in situ, followed by his cardiac electrophysiologist.          Discussion Notes: He will continue all his usual meds and supplements the same as listed on his med list.  He will follow-up with his neurologist and orthopedic surgeon as per their instructions. He is encouraged to start walking on a regular basis. Once he gets confirmation that it would be safe for him to have an MRI he will check with his neurologist and orthopedic surgeon to see if they would like to order that. Otherwise he will return as needed or in 3 months for routine follow-up visit and labs including a CMP and a CBC.

## 2021-08-19 ENCOUNTER — TELEPHONE (OUTPATIENT)
Dept: PRIMARY CARE CLINIC | Age: 81
End: 2021-08-19

## 2021-08-19 NOTE — TELEPHONE ENCOUNTER
----- Message from Sera Pena sent at 8/19/2021 11:28 AM EDT -----  Subject: Appointment Request    Reason for Call: Urgent (Patient Request) Existing Condition Follow    QUESTIONS  Type of Appointment? Established Patient  Reason for appointment request? Available appointments did not meet   patient need  Additional Information for Provider? Patient called to see if he can talk   to  about his mri results, he is asking just for a few minutes so   he can ask him a few question.   ---------------------------------------------------------------------------  --------------  CALL BACK INFO  What is the best way for the office to contact you? OK to leave message on   voicemail  Preferred Call Back Phone Number? [phone]  ---------------------------------------------------------------------------  --------------  SCRIPT ANSWERS  Relationship to Patient? Self  (Is the patient requesting to be seen urgently for their symptoms?)? Yes  Is this follow up request related to routine Diabetes Management? No  Are you having any new concerns about your existing condition? Yes  Have you been diagnosed with, awaiting test results for, or told that you   are suspected of having COVID-19 (Coronavirus)? (If patient has tested   negative or was tested as a requirement for work, school, or travel and   not based on symptoms, answer no)? No  Do you currently have flu-like symptoms including fever or chills, cough,   shortness of breath, difficulty breathing, or new loss of taste or smell? No  Have you had close contact with someone with COVID-19 in the last 14 days? No  (Service Expert  click yes below to proceed with [employer] As Usual   Scheduling)?  Yes

## 2021-08-20 ENCOUNTER — OFFICE VISIT (OUTPATIENT)
Dept: PRIMARY CARE CLINIC | Age: 81
End: 2021-08-20
Payer: MEDICARE

## 2021-08-20 VITALS
HEART RATE: 81 BPM | OXYGEN SATURATION: 96 % | WEIGHT: 26 LBS | TEMPERATURE: 96.6 F | DIASTOLIC BLOOD PRESSURE: 70 MMHG | HEIGHT: 72 IN | RESPIRATION RATE: 18 BRPM | SYSTOLIC BLOOD PRESSURE: 138 MMHG | BODY MASS INDEX: 3.52 KG/M2

## 2021-08-20 DIAGNOSIS — Z86.79 HISTORY OF ATRIAL FIBRILLATION: ICD-10-CM

## 2021-08-20 DIAGNOSIS — I10 ESSENTIAL HYPERTENSION: ICD-10-CM

## 2021-08-20 DIAGNOSIS — G40.409 GRAND MAL SEIZURE DISORDER (HCC): Primary | ICD-10-CM

## 2021-08-20 DIAGNOSIS — Z79.01 ANTICOAGULATED: ICD-10-CM

## 2021-08-20 PROCEDURE — G8427 DOCREV CUR MEDS BY ELIG CLIN: HCPCS | Performed by: INTERNAL MEDICINE

## 2021-08-20 PROCEDURE — G8418 CALC BMI BLW LOW PARAM F/U: HCPCS | Performed by: INTERNAL MEDICINE

## 2021-08-20 PROCEDURE — 1123F ACP DISCUSS/DSCN MKR DOCD: CPT | Performed by: INTERNAL MEDICINE

## 2021-08-20 PROCEDURE — 99213 OFFICE O/P EST LOW 20 MIN: CPT | Performed by: INTERNAL MEDICINE

## 2021-08-20 PROCEDURE — 4040F PNEUMOC VAC/ADMIN/RCVD: CPT | Performed by: INTERNAL MEDICINE

## 2021-08-20 PROCEDURE — 1036F TOBACCO NON-USER: CPT | Performed by: INTERNAL MEDICINE

## 2021-08-20 NOTE — PROGRESS NOTES
Omar [place]  8/20/21     Chief Complaint   Patient presents with    Follow-up     mri of his brain result 8/12/21 at [facility]         Allergies   Allergen Reactions    Chicken Allergy     Shellfish-Derived Products     Turkey-Sweet Potatoes-Peaches [Alimentum]         Current Outpatient Medications   Medication Sig Dispense Refill    tamsulosin (FLOMAX) 0.4 MG capsule Take 0.4 mg by mouth daily      levETIRAcetam (KEPPRA) 500 MG tablet Take 1 tablet by mouth 2 times daily 60 tablet 5    losartan (COZAAR) 50 MG tablet Take 1 tablet by mouth daily 90 tablet 3    omeprazole (PRILOSEC) 40 MG delayed release capsule Take 1 capsule by mouth daily 90 capsule 3    metoprolol succinate (TOPROL XL) 50 MG extended release tablet TAKE 1 TABLET BY MOUTH ONCE DAILY      apixaban (ELIQUIS) 5 MG TABS tablet Take 1 tablet by mouth 2 times daily 60 tablet 5     No current facility-administered medications for this visit. HPI: Patient comes in for follow-up visit. He had his MRI of the brain done on 8/12/2021 at [facility].  It showed some cerebral atrophy but otherwise no acute pathology. He did have a reaction to the gadolinium dye and was treated in the emergency room at that facility. Those symptoms resolved completely. He remains on Keppra 500 mg twice daily and has not had any recurring seizures. He is anxious to start driving again. He has a follow-up appointment with his neurologist in a couple of weeks. Review of Systems: as per HPI      Physical Exam:    Patient is a 79 y.o. male. Patient appears to be in no distress. Breathing comfortably. Ambulates without assistance. HEENT: normal.  Neck supple, no adenopathy or bruits. Heart RR, no MGR. Lungs clear. Abd: normal  Ext: no edema. Peripheral pulses: normal.  No neurologic deficits noted. 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     Essential hypertension controlled on current meds. History of atrial fibrillation, currently maintained in sinus rhythm and followed by his cardiologist.    Anticoagulated on Eliquis with no reported bleeding issues. Discussion Notes: He will remain on all of his usual meds and supplements the same as listed on his med list.  He will follow-up with his neurologist as per her instructions. He was instructed not to resume driving until he is cleared by neurology. Return here as needed or in 2 months for routine follow-up visit.

## 2021-09-10 ENCOUNTER — OFFICE VISIT (OUTPATIENT)
Dept: NEUROLOGY | Age: 81
End: 2021-09-10
Payer: MEDICARE

## 2021-09-10 VITALS
HEIGHT: 72 IN | WEIGHT: 200 LBS | DIASTOLIC BLOOD PRESSURE: 78 MMHG | SYSTOLIC BLOOD PRESSURE: 130 MMHG | BODY MASS INDEX: 27.09 KG/M2

## 2021-09-10 DIAGNOSIS — R56.9 GENERALIZED SEIZURE (HCC): Primary | ICD-10-CM

## 2021-09-10 PROCEDURE — G8417 CALC BMI ABV UP PARAM F/U: HCPCS | Performed by: PSYCHIATRY & NEUROLOGY

## 2021-09-10 PROCEDURE — G8427 DOCREV CUR MEDS BY ELIG CLIN: HCPCS | Performed by: PSYCHIATRY & NEUROLOGY

## 2021-09-10 PROCEDURE — 1036F TOBACCO NON-USER: CPT | Performed by: PSYCHIATRY & NEUROLOGY

## 2021-09-10 PROCEDURE — 1123F ACP DISCUSS/DSCN MKR DOCD: CPT | Performed by: PSYCHIATRY & NEUROLOGY

## 2021-09-10 PROCEDURE — 99214 OFFICE O/P EST MOD 30 MIN: CPT | Performed by: PSYCHIATRY & NEUROLOGY

## 2021-09-10 PROCEDURE — 4040F PNEUMOC VAC/ADMIN/RCVD: CPT | Performed by: PSYCHIATRY & NEUROLOGY

## 2021-09-10 ASSESSMENT — ENCOUNTER SYMPTOMS
EYES NEGATIVE: 1
GASTROINTESTINAL NEGATIVE: 1
ALLERGIC/IMMUNOLOGIC NEGATIVE: 1

## 2021-09-10 NOTE — PROGRESS NOTES
Neurology Progress Note, Follow-up:    Patient: Norma Sinha  : 1940  Date: 09/10/21  Primary provider: Yareli Montiel MD     Re: Followup OV, history new onset generalized seizure x 1; history of atrial fibrillation status post ablation and pacemaker on Eliquis, aortic valve replacement, right bundle branch block and history of hypertension. Dear Yareli Montiel MD:    Mr. Chari Kim presents for a follow-up office visit in regards to history of new onset generalized major motor seizure associated with tongue biting and postictal amnesia and confusion, secondary to suspected cardioembolic event related to atrial fibrillation as a possibility. He continues an empiric trial of Keppra 500 mg twice daily. He reports no recent or recurrent seizures or confusional episodes. Please refer to prior Neurology consult letter of 2021 for additional information if needed. ROS, family/social history, medication/allergy list: Reviewed, updated. Imaging data: MR brain scan with and without contrast, 2021, [facility]: Moderate diffuse brain atrophy and chronic microvascular ischemic disease, subcortical, periventricular white matter regions. Current Outpatient Medications   Medication Sig Dispense Refill    apixaban (ELIQUIS) 5 MG TABS tablet Take 1 tablet by mouth 2 times daily 180 tablet 1    tamsulosin (FLOMAX) 0.4 MG capsule Take 0.4 mg by mouth daily      levETIRAcetam (KEPPRA) 500 MG tablet Take 1 tablet by mouth 2 times daily 60 tablet 5    losartan (COZAAR) 50 MG tablet Take 1 tablet by mouth daily 90 tablet 3    omeprazole (PRILOSEC) 40 MG delayed release capsule Take 1 capsule by mouth daily 90 capsule 3    metoprolol succinate (TOPROL XL) 50 MG extended release tablet TAKE 1 TABLET BY MOUTH ONCE DAILY       No current facility-administered medications for this visit.        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 ([facility] [address]    Cardiac pacemaker in situ       Past Medical History:   Diagnosis Date    Aortic valve regurgitation     Arrhythmia     Atrial fibrillation (HCC)     Dizziness     GERD (gastroesophageal reflux disease)     Heartburn     History of stress test     Hypertension     Lightheadedness        Past Surgical History:   Procedure Laterality Date    OTHER SURGICAL HISTORY      Ablation Cardiac    PACEMAKER PLACEMENT      TONSILLECTOMY         Family History   Problem Relation Age of Onset    Other Mother         brain aneurysm    Stroke Father     Other Father         pacemaker    Cancer Sister        Social History     Socioeconomic History    Marital status:      Spouse name: Not on file    Number of children: Not on file    Years of education: Not on file    Highest education level: Not on file   Occupational History    Not on file   Tobacco Use    Smoking status: Never Smoker    Smokeless tobacco: Never Used   Vaping Use    Vaping Use: Never used   Substance and Sexual Activity    Alcohol use:  Yes     Alcohol/week: 2.0 standard drinks     Types: 1 Cans of beer, 1 Shots of liquor per week     Comment: 2 drinks 5 days a week    Drug use: No    Sexual activity: Not on file   Other Topics Concern    Not on file   Social History Narrative    Not on file     Social Determinants of Health     Financial Resource Strain: Low Risk     Difficulty of Paying Living Expenses: Not hard at all   Food Insecurity: No Food Insecurity    Worried About Running Out of Food in the Last Year: Never true    Artie of [employer] reflexes. Sensory modalities remain grossly intact subjectively throughout. Coordination/Gait: No gross limb dysmetria on finger-to-nose testing, no truncal or cerebellar gait ataxia. Assessment/Plan:   1. Hx new onset generalized seizure, clinically stable, continues Keppra 500 mg bid prophylaxis. 2. He may consider driving once again, but no long distance trips advised. 3. Follow-up on an annual basis prn in Neurology clinic. Sincerely,      Morro Bhagat MD    Please note this report has been partially produced using speech recognition software and may cause contain errors related to that system including grammar, punctuation and spelling or words and phrases that may not seem appropriate. If there are questions or concerns please feel free to contact me to clarify.

## 2021-09-15 ENCOUNTER — HOSPITAL ENCOUNTER (OUTPATIENT)
Age: 81
Discharge: HOME | End: 2021-09-15
Payer: MEDICARE

## 2021-09-15 LAB
ALBUMIN SERPL-MCNC: 4.1 G/DL (ref 3.5–5.2)
ALP BLD-CCNC: 67 U/L (ref 40–129)
ALT SERPL-CCNC: 17 U/L (ref 0–40)
ANION GAP SERPL CALCULATED.3IONS-SCNC: 7 MMOL/L (ref 7–16)
AST SERPL-CCNC: 20 U/L (ref 0–39)
BACTERIA: NORMAL /HPF
BASOPHILS ABSOLUTE: 0.01 E9/L (ref 0–0.2)
BASOPHILS RELATIVE PERCENT: 0.2 % (ref 0–2)
BILIRUB SERPL-MCNC: 0.9 MG/DL (ref 0–1.2)
BILIRUBIN URINE: NEGATIVE
BLOOD, URINE: ABNORMAL
BUN BLDV-MCNC: 15 MG/DL (ref 6–23)
CALCIUM SERPL-MCNC: 8.9 MG/DL (ref 8.6–10.2)
CHLORIDE BLD-SCNC: 105 MMOL/L (ref 98–107)
CLARITY: CLEAR
CO2: 28 MMOL/L (ref 22–29)
COLOR: ABNORMAL
CREAT SERPL-MCNC: 1.3 MG/DL (ref 0.7–1.2)
EOSINOPHILS ABSOLUTE: 0.12 E9/L (ref 0.05–0.5)
EOSINOPHILS RELATIVE PERCENT: 2.1 % (ref 0–6)
GFR AFRICAN AMERICAN: >60
GFR NON-AFRICAN AMERICAN: 53 ML/MIN/1.73
GLUCOSE BLD-MCNC: 98 MG/DL (ref 74–99)
GLUCOSE URINE: NEGATIVE MG/DL
HCT VFR BLD CALC: 44.2 % (ref 37–54)
HEMOGLOBIN: 15.1 G/DL (ref 12.5–16.5)
IMMATURE GRANULOCYTES #: 0.03 E9/L
IMMATURE GRANULOCYTES %: 0.5 % (ref 0–5)
KETONES, URINE: NEGATIVE MG/DL
LEUKOCYTE ESTERASE, URINE: NEGATIVE
LYMPHOCYTES ABSOLUTE: 1.24 E9/L (ref 1.5–4)
LYMPHOCYTES RELATIVE PERCENT: 21.3 % (ref 20–42)
MCH RBC QN AUTO: 31 PG (ref 26–35)
MCHC RBC AUTO-ENTMCNC: 34.2 % (ref 32–34.5)
MCV RBC AUTO: 90.8 FL (ref 80–99.9)
MONOCYTES ABSOLUTE: 0.64 E9/L (ref 0.1–0.95)
MONOCYTES RELATIVE PERCENT: 11 % (ref 2–12)
NEUTROPHILS ABSOLUTE: 3.77 E9/L (ref 1.8–7.3)
NEUTROPHILS RELATIVE PERCENT: 64.9 % (ref 43–80)
NITRITE, URINE: NEGATIVE
PDW BLD-RTO: 14 FL (ref 11.5–15)
PH UA: 6 (ref 5–9)
PLATELET # BLD: 143 E9/L (ref 130–450)
PMV BLD AUTO: 8.8 FL (ref 7–12)
POTASSIUM SERPL-SCNC: 4.4 MMOL/L (ref 3.5–5)
PROTEIN UA: NEGATIVE MG/DL
RBC # BLD: 4.87 E12/L (ref 3.8–5.8)
RBC UA: NORMAL /HPF (ref 0–2)
SODIUM BLD-SCNC: 140 MMOL/L (ref 132–146)
SPECIFIC GRAVITY UA: 1.02 (ref 1–1.03)
TOTAL PROTEIN: 6.9 G/DL (ref 6.4–8.3)
UROBILINOGEN, URINE: 1 E.U./DL
WBC # BLD: 5.8 E9/L (ref 4.5–11.5)
WBC UA: NORMAL /HPF (ref 0–5)

## 2021-09-15 PROCEDURE — 80053 COMPREHEN METABOLIC PANEL: CPT

## 2021-09-15 PROCEDURE — 36415 COLL VENOUS BLD VENIPUNCTURE: CPT

## 2021-09-15 PROCEDURE — 87088 URINE BACTERIA CULTURE: CPT

## 2021-09-15 PROCEDURE — 81001 URINALYSIS AUTO W/SCOPE: CPT

## 2021-09-15 PROCEDURE — 85025 COMPLETE CBC W/AUTO DIFF WBC: CPT

## 2021-09-16 ENCOUNTER — OFFICE VISIT (OUTPATIENT)
Dept: PRIMARY CARE CLINIC | Age: 81
End: 2021-09-16
Payer: MEDICARE

## 2021-09-16 VITALS
DIASTOLIC BLOOD PRESSURE: 70 MMHG | RESPIRATION RATE: 18 BRPM | BODY MASS INDEX: 28.31 KG/M2 | HEART RATE: 67 BPM | SYSTOLIC BLOOD PRESSURE: 138 MMHG | OXYGEN SATURATION: 100 % | HEIGHT: 72 IN | TEMPERATURE: 97.2 F | WEIGHT: 209 LBS

## 2021-09-16 DIAGNOSIS — M16.12 PRIMARY OSTEOARTHRITIS OF LEFT HIP: ICD-10-CM

## 2021-09-16 DIAGNOSIS — I10 ESSENTIAL HYPERTENSION: ICD-10-CM

## 2021-09-16 DIAGNOSIS — G40.409 GRAND MAL SEIZURE DISORDER (HCC): ICD-10-CM

## 2021-09-16 DIAGNOSIS — R97.20 BPH WITH ELEVATED PSA: ICD-10-CM

## 2021-09-16 DIAGNOSIS — Z01.818 PREOP EXAMINATION: Primary | ICD-10-CM

## 2021-09-16 DIAGNOSIS — Z95.0 CARDIAC PACEMAKER IN SITU: ICD-10-CM

## 2021-09-16 DIAGNOSIS — N40.0 BPH WITH ELEVATED PSA: ICD-10-CM

## 2021-09-16 DIAGNOSIS — Z79.01 ANTICOAGULATED: ICD-10-CM

## 2021-09-16 DIAGNOSIS — Z86.79 HISTORY OF ATRIAL FIBRILLATION: ICD-10-CM

## 2021-09-16 PROBLEM — S49.92XA LEFT UPPER ARM INJURY: Status: RESOLVED | Noted: 2021-07-21 | Resolved: 2021-09-16

## 2021-09-16 PROBLEM — R39.11 BENIGN PROSTATIC HYPERPLASIA WITH URINARY HESITANCY: Status: RESOLVED | Noted: 2020-07-14 | Resolved: 2021-09-16

## 2021-09-16 PROBLEM — M16.11 PRIMARY OSTEOARTHRITIS OF RIGHT HIP: Status: RESOLVED | Noted: 2020-07-14 | Resolved: 2021-09-16

## 2021-09-16 PROBLEM — N40.1 BENIGN PROSTATIC HYPERPLASIA WITH URINARY HESITANCY: Status: RESOLVED | Noted: 2020-07-14 | Resolved: 2021-09-16

## 2021-09-16 PROCEDURE — 1123F ACP DISCUSS/DSCN MKR DOCD: CPT | Performed by: INTERNAL MEDICINE

## 2021-09-16 PROCEDURE — G8417 CALC BMI ABV UP PARAM F/U: HCPCS | Performed by: INTERNAL MEDICINE

## 2021-09-16 PROCEDURE — 99214 OFFICE O/P EST MOD 30 MIN: CPT | Performed by: INTERNAL MEDICINE

## 2021-09-16 PROCEDURE — G8427 DOCREV CUR MEDS BY ELIG CLIN: HCPCS | Performed by: INTERNAL MEDICINE

## 2021-09-16 PROCEDURE — 1036F TOBACCO NON-USER: CPT | Performed by: INTERNAL MEDICINE

## 2021-09-16 PROCEDURE — 4040F PNEUMOC VAC/ADMIN/RCVD: CPT | Performed by: INTERNAL MEDICINE

## 2021-09-16 PROCEDURE — 93000 ELECTROCARDIOGRAM COMPLETE: CPT | Performed by: INTERNAL MEDICINE

## 2021-09-16 NOTE — PROGRESS NOTES
normal  Ext: no edema. Peripheral pulses: normal.  No neurologic deficits noted. Lab Results   Component Value Date    WBC 5.8 09/15/2021    HGB 15.1 09/15/2021    HCT 44.2 09/15/2021     09/15/2021    CHOL 157 07/19/2021    TRIG 156 (H) 07/19/2021    HDL 32 07/19/2021    ALT 17 09/15/2021    AST 20 09/15/2021    TSH 0.805 01/26/2021    PSA 7.02 (H) 06/06/2018    INR 1.3 05/09/2021    LABA1C 4.7 07/19/2021      Lab Results   Component Value Date     09/15/2021    K 4.4 09/15/2021     09/15/2021    CO2 28 09/15/2021    BUN 15 09/15/2021    CREATININE 1.3 (H) 09/15/2021    GLUCOSE 98 09/15/2021    CALCIUM 8.9 09/15/2021    PROT 6.9 09/15/2021    LABALBU 4.1 09/15/2021    BILITOT 0.9 09/15/2021    ALKPHOS 67 09/15/2021    AST 20 09/15/2021    ALT 17 09/15/2021    LABGLOM 53 09/15/2021    GFRAA >60 09/15/2021     ECG: Sinus rhythm with RBBB and left anterior fascicular block. No acute changes. Unchanged from previous EKG done in May of this year. Assessment:    Eligio Huitron was seen today for pre-op exam.    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 [address] stable on Keppra 500 mg twice daily. Followed by neurology. Primary osteoarthritis of left hip    History of atrial fibrillation, currently maintained in sinus rhythm. Cardiac pacemaker in situ, functioning and followed by his cardiac electrophysiologist.    Anticoagulated on Eliquis with no reported bleeding issues. BPH with elevated PSA, stable and followed by his urologist.          Discussion Notes: He will continue all his usual meds and supplements the same as listed on his med list.  He is medically stable for upcoming left total hip arthroplasty. He will follow-up with his neurologist, cardiologist, and urologist as per their instructions. Return here in about 6 weeks for routine follow-up visit.

## 2021-09-17 LAB — URINE CULTURE, ROUTINE: NORMAL

## 2021-10-11 RX ORDER — METOPROLOL SUCCINATE 50 MG/1
50 TABLET, EXTENDED RELEASE ORAL DAILY
Qty: 90 TABLET | Refills: 3 | Status: SHIPPED
Start: 2021-10-11 | End: 2022-10-10 | Stop reason: SDUPTHER

## 2021-10-18 ENCOUNTER — OFFICE VISIT (OUTPATIENT)
Dept: FAMILY MEDICINE CLINIC | Age: 81
End: 2021-10-18
Payer: MEDICARE

## 2021-10-18 VITALS
BODY MASS INDEX: 27.63 KG/M2 | DIASTOLIC BLOOD PRESSURE: 68 MMHG | HEIGHT: 72 IN | RESPIRATION RATE: 20 BRPM | TEMPERATURE: 97.3 F | WEIGHT: 204 LBS | OXYGEN SATURATION: 94 % | HEART RATE: 70 BPM | SYSTOLIC BLOOD PRESSURE: 122 MMHG

## 2021-10-18 DIAGNOSIS — J01.90 ACUTE BACTERIAL SINUSITIS: ICD-10-CM

## 2021-10-18 DIAGNOSIS — B96.89 ACUTE BACTERIAL SINUSITIS: ICD-10-CM

## 2021-10-18 DIAGNOSIS — R43.2 TASTE ABSENT: Primary | ICD-10-CM

## 2021-10-18 PROCEDURE — 1123F ACP DISCUSS/DSCN MKR DOCD: CPT | Performed by: FAMILY MEDICINE

## 2021-10-18 PROCEDURE — G8417 CALC BMI ABV UP PARAM F/U: HCPCS | Performed by: FAMILY MEDICINE

## 2021-10-18 PROCEDURE — 1036F TOBACCO NON-USER: CPT | Performed by: FAMILY MEDICINE

## 2021-10-18 PROCEDURE — 4040F PNEUMOC VAC/ADMIN/RCVD: CPT | Performed by: FAMILY MEDICINE

## 2021-10-18 PROCEDURE — G8427 DOCREV CUR MEDS BY ELIG CLIN: HCPCS | Performed by: FAMILY MEDICINE

## 2021-10-18 PROCEDURE — 99213 OFFICE O/P EST LOW 20 MIN: CPT | Performed by: FAMILY MEDICINE

## 2021-10-18 PROCEDURE — G8484 FLU IMMUNIZE NO ADMIN: HCPCS | Performed by: FAMILY MEDICINE

## 2021-10-18 RX ORDER — AMOXICILLIN 875 MG/1
875 TABLET, COATED ORAL 2 TIMES DAILY
Qty: 14 TABLET | Refills: 0 | Status: SHIPPED | OUTPATIENT
Start: 2021-10-18 | End: 2021-10-25

## 2021-10-18 RX ORDER — PREDNISONE 10 MG/1
10 TABLET ORAL 2 TIMES DAILY
Qty: 10 TABLET | Refills: 0 | Status: SHIPPED | OUTPATIENT
Start: 2021-10-18 | End: 2021-10-23

## 2021-10-18 ASSESSMENT — ENCOUNTER SYMPTOMS
CONSTIPATION: 0
DIARRHEA: 0
COUGH: 0
BACK PAIN: 0
SORE THROAT: 0
NAUSEA: 0
VOMITING: 0
BLOOD IN STOOL: 0
ABDOMINAL PAIN: 0
SHORTNESS OF BREATH: 0
PHOTOPHOBIA: 0

## 2021-10-18 NOTE — PROGRESS NOTES
Evert Hernandez (:  1940) is a 79 y.o. male,Established patient, here for evaluation of the following chief complaint(s):  Taste Change         ASSESSMENT/PLAN:  1. Taste absent  -     COVID-19 Ambulatory; Future  -     amoxicillin (AMOXIL) 875 MG tablet; Take 1 tablet by mouth 2 times daily for 7 days, Disp-14 tablet, R-0Normal  -     predniSONE (DELTASONE) 10 MG tablet; Take 1 tablet by mouth 2 times daily for 5 days, Disp-10 tablet, R-0Normal  2. Acute bacterial sinusitis  -     amoxicillin (AMOXIL) 875 MG tablet; Take 1 tablet by mouth 2 times daily for 7 days, Disp-14 tablet, R-0Normal  -     predniSONE (DELTASONE) 10 MG tablet; Take 1 tablet by mouth 2 times daily for 5 days, Disp-10 tablet, R-0Normal    We will treat symptomatically. Covid sent out obtained. Red flags discussed with patient. If these occur he is to go directly to the emergency department. Patient voiced understanding. No follow-ups on file. Subjective   SUBJECTIVE/OBJECTIVE:  HPI  Patient presents today for evaluation of loss of taste/smell for the last 10 days. Patient denies any other systemic symptoms. Denies any fever or chills. Denies any chest pain or shortness of breath. Denies any nausea vomiting or diarrhea. Denies any known sick contacts or recent travel. Patient has had Covid vaccination but has not had his booster. Review of Systems   Constitutional: Negative for chills and fever. HENT: Positive for congestion. Negative for hearing loss, nosebleeds and sore throat. Loss of taste and smell   Eyes: Negative for photophobia. Respiratory: Negative for cough and shortness of breath. Cardiovascular: Negative for chest pain, palpitations and leg swelling. Gastrointestinal: Negative for abdominal pain, blood in stool, constipation, diarrhea, nausea and vomiting. Endocrine: Negative for polydipsia. Genitourinary: Negative for dysuria, frequency, hematuria and urgency.    Musculoskeletal: Socioeconomic History    Marital status:      Spouse name: Not on file    Number of children: Not on file    Years of education: Not on file    Highest education level: Not on file   Occupational History    Not on file   Tobacco Use    Smoking status: Never Smoker    Smokeless tobacco: Never Used   Vaping Use    Vaping Use: Never used   Substance and Sexual Activity    Alcohol use: Yes     Alcohol/week: 2.0 standard drinks     Types: 1 Cans of beer, 1 Shots of liquor per week     Comment: 2 drinks 5 days a week    Drug use: No    Sexual activity: Not on file   Other Topics Concern    Not on file   Social History Narrative    Not on file     Social Determinants of Health     Financial Resource Strain: Low Risk     Difficulty of Paying Living Expenses: Not hard at all   Food Insecurity: No Food Insecurity    Worried About Running Out of Food in the Last Year: Never true    Artie of Food in the Last Year: Never true   Transportation Needs:     Lack of Transportation (Medical):  Lack of Transportation (Non-Medical):    Physical Activity:     Days of Exercise per Week:     Minutes of Exercise per Session:    Stress:     Feeling of Stress :    Social Connections:     Frequency of Communication with Friends and Family:     Frequency of Social Gatherings with Friends and Family:     Attends Zoroastrianism Services:     Active Member of Clubs or Organizations:     Attends Club or Organization Meetings:     Marital Status:    Intimate Partner Violence:     Fear of Current or Ex-Partner:     Emotionally Abused:     Physically Abused:     Sexually Abused:      Family History   Problem Relation Age of Onset    Other Mother         brain aneurysm    Stroke Father     Other Father         pacemaker    Cancer Sister       There are no preventive care reminders to display for this patient. There are no preventive care reminders to display for this patient.    There are no preventive care reminders to display for this patient. Health Maintenance Due   Topic    Shingles Vaccine (1 of 2)    DTaP/Tdap/Td vaccine (1 - Tdap)      Health Maintenance   Topic Date Due    Shingles Vaccine (1 of 2) Never done    DTaP/Tdap/Td vaccine (1 - Tdap) 06/06/2012    PSA counseling  06/06/2019    COVID-19 Vaccine (3 - Pfizer booster) 08/12/2021    Flu vaccine (1) 09/01/2021    Annual Wellness Visit (AWV)  11/24/2021    Potassium monitoring  09/15/2022    Creatinine monitoring  09/15/2022    Pneumococcal 65+ years Vaccine  Completed    Hepatitis A vaccine  Aged Out    Hepatitis B vaccine  Aged Out    Hib vaccine  Aged Out    Meningococcal (ACWY) vaccine  Aged Out      Health Maintenance Due   Topic Date Due    PSA counseling  06/06/2019      There are no preventive care reminders to display for this patient. /68 (Site: Right Upper Arm, Position: Sitting, Cuff Size: Medium Adult)   Pulse 70   Temp 97.3 °F (36.3 °C) (Temporal)   Resp 20   Ht 6' (1.829 m)   Wt 204 lb (92.5 kg)   SpO2 94%   BMI 27.67 kg/m²     Objective   Physical Exam  Vitals reviewed. Constitutional:       Appearance: He is well-developed. He is not ill-appearing. HENT:      Head: Normocephalic and atraumatic. Right Ear: Hearing normal. A middle ear effusion is present. Tympanic membrane is bulging. Left Ear: Hearing normal. A middle ear effusion is present. Tympanic membrane is bulging. Nose: Nose normal.      Mouth/Throat:      Dentition: Normal dentition. Pharynx: Posterior oropharyngeal erythema present. No oropharyngeal exudate. Tonsils: No tonsillar exudate. Eyes:      Conjunctiva/sclera: Conjunctivae normal.      Pupils: Pupils are equal, round, and reactive to light. Cardiovascular:      Rate and Rhythm: Normal rate and regular rhythm. Heart sounds: Normal heart sounds. No murmur heard. Pulmonary:      Effort: Pulmonary effort is normal. No respiratory distress.

## 2021-10-19 DIAGNOSIS — R43.2 TASTE ABSENT: ICD-10-CM

## 2021-10-20 LAB
SARS-COV-2: NOT DETECTED
SOURCE: NORMAL

## 2021-10-22 DIAGNOSIS — G40.409 GRAND MAL SEIZURE DISORDER (HCC): ICD-10-CM

## 2021-10-22 DIAGNOSIS — I10 ESSENTIAL HYPERTENSION: ICD-10-CM

## 2021-10-22 LAB
ALBUMIN SERPL-MCNC: 3.7 G/DL (ref 3.5–5.2)
ALP BLD-CCNC: 109 U/L (ref 40–129)
ALT SERPL-CCNC: 11 U/L (ref 0–40)
ANION GAP SERPL CALCULATED.3IONS-SCNC: 9 MMOL/L (ref 7–16)
AST SERPL-CCNC: 18 U/L (ref 0–39)
BASOPHILS ABSOLUTE: 0.02 E9/L (ref 0–0.2)
BASOPHILS RELATIVE PERCENT: 0.3 % (ref 0–2)
BILIRUB SERPL-MCNC: 0.5 MG/DL (ref 0–1.2)
BUN BLDV-MCNC: 16 MG/DL (ref 6–23)
CALCIUM SERPL-MCNC: 9.4 MG/DL (ref 8.6–10.2)
CHLORIDE BLD-SCNC: 105 MMOL/L (ref 98–107)
CO2: 27 MMOL/L (ref 22–29)
CREAT SERPL-MCNC: 1.3 MG/DL (ref 0.7–1.2)
EOSINOPHILS ABSOLUTE: 0.09 E9/L (ref 0.05–0.5)
EOSINOPHILS RELATIVE PERCENT: 1.4 % (ref 0–6)
GFR AFRICAN AMERICAN: >60
GFR NON-AFRICAN AMERICAN: 53 ML/MIN/1.73
GLUCOSE BLD-MCNC: 109 MG/DL (ref 74–99)
HCT VFR BLD CALC: 38.5 % (ref 37–54)
HEMOGLOBIN: 12.6 G/DL (ref 12.5–16.5)
IMMATURE GRANULOCYTES #: 0.04 E9/L
IMMATURE GRANULOCYTES %: 0.6 % (ref 0–5)
LYMPHOCYTES ABSOLUTE: 1.43 E9/L (ref 1.5–4)
LYMPHOCYTES RELATIVE PERCENT: 21.7 % (ref 20–42)
MCH RBC QN AUTO: 30.9 PG (ref 26–35)
MCHC RBC AUTO-ENTMCNC: 32.7 % (ref 32–34.5)
MCV RBC AUTO: 94.4 FL (ref 80–99.9)
MONOCYTES ABSOLUTE: 0.65 E9/L (ref 0.1–0.95)
MONOCYTES RELATIVE PERCENT: 9.9 % (ref 2–12)
NEUTROPHILS ABSOLUTE: 4.35 E9/L (ref 1.8–7.3)
NEUTROPHILS RELATIVE PERCENT: 66.1 % (ref 43–80)
PDW BLD-RTO: 15.2 FL (ref 11.5–15)
PLATELET # BLD: 179 E9/L (ref 130–450)
PMV BLD AUTO: 9.3 FL (ref 7–12)
POTASSIUM SERPL-SCNC: 4.8 MMOL/L (ref 3.5–5)
RBC # BLD: 4.08 E12/L (ref 3.8–5.8)
SODIUM BLD-SCNC: 141 MMOL/L (ref 132–146)
TOTAL PROTEIN: 6.4 G/DL (ref 6.4–8.3)
WBC # BLD: 6.6 E9/L (ref 4.5–11.5)

## 2021-10-27 ENCOUNTER — OFFICE VISIT (OUTPATIENT)
Dept: PRIMARY CARE CLINIC | Age: 81
End: 2021-10-27
Payer: MEDICARE

## 2021-10-27 VITALS
OXYGEN SATURATION: 97 % | DIASTOLIC BLOOD PRESSURE: 70 MMHG | BODY MASS INDEX: 28.04 KG/M2 | HEIGHT: 72 IN | SYSTOLIC BLOOD PRESSURE: 108 MMHG | WEIGHT: 207 LBS | TEMPERATURE: 97.9 F | HEART RATE: 76 BPM | RESPIRATION RATE: 18 BRPM

## 2021-10-27 DIAGNOSIS — M15.9 PRIMARY OSTEOARTHRITIS INVOLVING MULTIPLE JOINTS: ICD-10-CM

## 2021-10-27 DIAGNOSIS — Z79.01 ANTICOAGULATED: ICD-10-CM

## 2021-10-27 DIAGNOSIS — G40.409 GRAND MAL SEIZURE DISORDER (HCC): Primary | ICD-10-CM

## 2021-10-27 DIAGNOSIS — Z86.79 HISTORY OF ATRIAL FIBRILLATION: ICD-10-CM

## 2021-10-27 DIAGNOSIS — I10 ESSENTIAL HYPERTENSION: ICD-10-CM

## 2021-10-27 DIAGNOSIS — R43.0 ANOSMIA: ICD-10-CM

## 2021-10-27 PROCEDURE — 99214 OFFICE O/P EST MOD 30 MIN: CPT | Performed by: INTERNAL MEDICINE

## 2021-10-27 PROCEDURE — G8427 DOCREV CUR MEDS BY ELIG CLIN: HCPCS | Performed by: INTERNAL MEDICINE

## 2021-10-27 PROCEDURE — 1123F ACP DISCUSS/DSCN MKR DOCD: CPT | Performed by: INTERNAL MEDICINE

## 2021-10-27 PROCEDURE — G8417 CALC BMI ABV UP PARAM F/U: HCPCS | Performed by: INTERNAL MEDICINE

## 2021-10-27 PROCEDURE — 4040F PNEUMOC VAC/ADMIN/RCVD: CPT | Performed by: INTERNAL MEDICINE

## 2021-10-27 PROCEDURE — G8484 FLU IMMUNIZE NO ADMIN: HCPCS | Performed by: INTERNAL MEDICINE

## 2021-10-27 PROCEDURE — 1036F TOBACCO NON-USER: CPT | Performed by: INTERNAL MEDICINE

## 2021-10-27 NOTE — PROGRESS NOTES
Mercer Bloch  10/27/21     Chief Complaint   Patient presents with    Hypertension     3 mos follow up with labs        Allergies   Allergen Reactions    Chicken Allergy     Shellfish-Derived Products     Turkey-Sweet Potatoes-Peaches [Alimentum]         Current Outpatient Medications   Medication Sig Dispense Refill    metoprolol succinate (TOPROL XL) 50 MG extended release tablet Take 1 tablet by mouth daily 90 tablet 3    apixaban (ELIQUIS) 5 MG TABS tablet Take 1 tablet by mouth 2 times daily 180 tablet 1    tamsulosin (FLOMAX) 0.4 MG capsule Take 0.4 mg by mouth daily      levETIRAcetam (KEPPRA) 500 MG tablet Take 1 tablet by mouth 2 times daily 60 tablet 5    losartan (COZAAR) 50 MG tablet Take 1 tablet by mouth daily 90 tablet 3    omeprazole (PRILOSEC) 40 MG delayed release capsule Take 1 capsule by mouth daily 90 capsule 3     No current facility-administered medications for this visit. HPI: Patient comes in for follow-up visit. About a month ago he noticed that he had sudden onset of loss of taste. He had no other symptoms. On 10/18/2021 he was seen at [address] [place] [address]. His COVID-19 test was negative. He was told he had a sinus infection but he had no other symptoms he denied any facial pressure or congestion and no discolored drainage. He did not take the antibiotic and steroid. He still has loss of taste but it seems to be improving slightly. He has not had any seizures and he follows up with neurology for his seizure disorder remains on Keppra 500 mg twice daily. He remains on all of his other meds and supplements the same as listed on his med list, which was reviewed with him. He remains anticoagulated on Eliquis due to his history of A. fib and has not had any bleeding issues. He recently underwent a left total hip arthroplasty in [place] and he is doing well with his rehab and is going to be starting physical therapy locally.     Review of Systems: as per HPI      Physical Exam:    Patient is a 79 y.o. male. Patient appears to be in no distress. Breathing comfortably. Ambulates without assistance. HEENT: normal.  Neck supple, no adenopathy or bruits. Heart RR, no MGR. Lungs clear. Abd: normal  Ext: no edema. Peripheral pulses: normal.  No neurologic deficits noted. Lab Results   Component Value Date    WBC 6.6 10/22/2021    HGB 12.6 10/22/2021    HCT 38.5 10/22/2021     10/22/2021    CHOL 157 07/19/2021    TRIG 156 (H) 07/19/2021    HDL 32 07/19/2021    ALT 11 10/22/2021    AST 18 10/22/2021    TSH 0.805 01/26/2021    PSA 7.02 (H) 06/06/2018    INR 1.3 05/09/2021    LABA1C 4.7 07/19/2021      Lab Results   Component Value Date     10/22/2021    K 4.8 10/22/2021     10/22/2021    CO2 27 10/22/2021    BUN 16 10/22/2021    CREATININE 1.3 (H) 10/22/2021    GLUCOSE 109 (H) 10/22/2021    CALCIUM 9.4 10/22/2021    PROT 6.4 10/22/2021    LABALBU 3.7 10/22/2021    BILITOT 0.5 10/22/2021    ALKPHOS 109 10/22/2021    AST 18 10/22/2021    ALT 11 10/22/2021    LABGLOM 53 10/22/2021    GFRAA >60 10/22/2021            Assessment:    Derick De La Cruz was seen today for hypertension. Diagnoses and all orders for this visit:    Grand mal seizure disorder ([facility] [address]    Anosmia    History of atrial fibrillation    Anticoagulated    Essential hypertension    Primary osteoarthritis involving multiple joints status post recent left total hip arthroplasty. Discussion Notes: He will continue all of his usual meds and supplements the same as listed on his med list.  He will follow-up with his neurologist as per her instructions.   Otherwise due to return here in 3 months for his annual physical.

## 2021-11-30 ENCOUNTER — OFFICE VISIT (OUTPATIENT)
Dept: PRIMARY CARE CLINIC | Age: 81
End: 2021-11-30
Payer: MEDICARE

## 2021-11-30 VITALS
WEIGHT: 212 LBS | RESPIRATION RATE: 18 BRPM | TEMPERATURE: 97.2 F | DIASTOLIC BLOOD PRESSURE: 70 MMHG | HEIGHT: 72 IN | BODY MASS INDEX: 28.71 KG/M2 | HEART RATE: 60 BPM | SYSTOLIC BLOOD PRESSURE: 120 MMHG | OXYGEN SATURATION: 93 %

## 2021-11-30 DIAGNOSIS — Z00.00 ROUTINE GENERAL MEDICAL EXAMINATION AT A HEALTH CARE FACILITY: Primary | ICD-10-CM

## 2021-11-30 PROCEDURE — 4040F PNEUMOC VAC/ADMIN/RCVD: CPT | Performed by: INTERNAL MEDICINE

## 2021-11-30 PROCEDURE — 1123F ACP DISCUSS/DSCN MKR DOCD: CPT | Performed by: INTERNAL MEDICINE

## 2021-11-30 PROCEDURE — G8484 FLU IMMUNIZE NO ADMIN: HCPCS | Performed by: INTERNAL MEDICINE

## 2021-11-30 PROCEDURE — G0439 PPPS, SUBSEQ VISIT: HCPCS | Performed by: INTERNAL MEDICINE

## 2021-11-30 ASSESSMENT — LIFESTYLE VARIABLES
HOW OFTEN DO YOU HAVE A DRINK CONTAINING ALCOHOL: 3
HOW OFTEN DURING THE LAST YEAR HAVE YOU NEEDED AN ALCOHOLIC DRINK FIRST THING IN THE MORNING TO GET YOURSELF GOING AFTER A NIGHT OF HEAVY DRINKING: 0
HOW OFTEN DURING THE LAST YEAR HAVE YOU FOUND THAT YOU WERE NOT ABLE TO STOP DRINKING ONCE YOU HAD STARTED: 0
AUDIT TOTAL SCORE: 3
HOW OFTEN DURING THE LAST YEAR HAVE YOU FAILED TO DO WHAT WAS NORMALLY EXPECTED FROM YOU BECAUSE OF DRINKING: 0
HOW OFTEN DO YOU HAVE SIX OR MORE DRINKS ON ONE OCCASION: 0
HOW OFTEN DURING THE LAST YEAR HAVE YOU BEEN UNABLE TO REMEMBER WHAT HAPPENED THE NIGHT BEFORE BECAUSE YOU HAD BEEN DRINKING: 0
HOW OFTEN DURING THE LAST YEAR HAVE YOU HAD A FEELING OF GUILT OR REMORSE AFTER DRINKING: 0
AUDIT-C TOTAL SCORE: 3
HAVE YOU OR SOMEONE ELSE BEEN INJURED AS A RESULT OF YOUR DRINKING: 0
HAS A RELATIVE, FRIEND, DOCTOR, OR ANOTHER HEALTH PROFESSIONAL EXPRESSED CONCERN ABOUT YOUR DRINKING OR SUGGESTED YOU CUT DOWN: 0
HOW MANY STANDARD DRINKS CONTAINING ALCOHOL DO YOU HAVE ON A TYPICAL DAY: 0

## 2021-11-30 ASSESSMENT — PATIENT HEALTH QUESTIONNAIRE - PHQ9
1. LITTLE INTEREST OR PLEASURE IN DOING THINGS: 0
2. FEELING DOWN, DEPRESSED OR HOPELESS: 0
SUM OF ALL RESPONSES TO PHQ QUESTIONS 1-9: 0
SUM OF ALL RESPONSES TO PHQ9 QUESTIONS 1 & 2: 0

## 2021-11-30 NOTE — PROGRESS NOTES
Medicare Annual Wellness Visit  Name: Carlos Sidhu Date: 2021   MRN: [id number] Sex: Male   Age: 79 y.o. Ethnicity: Non- / Non    : 1940 Race: White (non-)      Raysa Roche is here for Medicare AWV (subsequent )  Patient comes in for his annual wellness visit. Currently is feeling fairly well. His seizure disorder has been under good control. He remains on all of his usual meds and supplements the same as listed on his med list, which was reviewed with him. Screenings for behavioral, psychosocial and functional/safety risks, and cognitive dysfunction are all negative except as indicated below. These results, as well as other patient data from the [address] form, are documented in Flowsheets linked to this Encounter. Allergies   Allergen Reactions    Chicken Allergy     Shellfish-Derived Products     Turkey-Sweet Potatoes-Peaches [Alimentum]          Prior to Visit Medications    Medication Sig Taking?  Authorizing Provider   metoprolol succinate (TOPROL XL) 50 MG extended release tablet Take 1 tablet by mouth daily Yes Merrill Matias MD   apixaban (ELIQUIS) 5 MG TABS tablet Take 1 tablet by mouth 2 times daily Yes Merrill Matias MD   tamsulosin (FLOMAX) 0.4 MG capsule Take 0.4 mg by mouth daily Yes Historical Provider, MD   levETIRAcetam (KEPPRA) 500 MG tablet Take 1 tablet by mouth 2 times daily Yes Felicia Recinos MD   losartan (COZAAR) 50 MG tablet Take 1 tablet by mouth daily Yes Merrill Matias MD   omeprazole (PRILOSEC) 40 MG delayed release capsule Take 1 capsule by mouth daily Yes Merrill Matias MD         Past Medical History:   Diagnosis Date    Aortic valve regurgitation     Arrhythmia     Atrial fibrillation (HCC)     Dizziness     GERD (gastroesophageal reflux disease)     Heartburn     History of stress test     Hypertension     Lightheadedness        Past Surgical History:   Procedure Laterality Date    OTHER SURGICAL HISTORY      Ablation Cardiac    PACEMAKER PLACEMENT      TONSILLECTOMY           Family History   Problem Relation Age of Onset    Other Mother         brain aneurysm    Stroke Father     Other Father         pacemaker    Cancer Sister        CareTeam (Including outside providers/suppliers regularly involved in providing care):   Patient Care Team:  Salvador Larkin MD as PCP - General (Internal Medicine)  Salvador Larkin MD as PCP - [facility] Kelsey Vazquez Provider  Jeremias Caceres MD as Surgeon (Urology)    Wt Readings from Last 3 Encounters:   11/30/21 212 lb (96.2 kg)   10/27/21 207 lb (93.9 kg)   10/18/21 204 lb (92.5 kg)     Vitals:    11/30/21 0930   BP: 120/70   Pulse: 60   Resp: 18   Temp: 97.2 °F (36.2 °C)   SpO2: 93%   Weight: 212 lb (96.2 kg)   Height: 6' (1.829 m)     Body mass index is 28.75 kg/m². Based upon direct observation of the patient, evaluation of cognition reveals recent and remote memory intact. Exam: Patient appears to be no distress. Breathing comfortably. HEENT normal. Neck supple adenopathy or bruits. Heart regular rhythm no murmurs gallops or rubs. Pacemaker noted left upper chest. Lungs clear. Abdomen normal. Extremities no edema. Peripheral pulses normal. No neurologic deficits noted. Patient's complete Health Risk Assessment and screening values have been reviewed and are found in Flowsheets. The following problems were reviewed today and where indicated follow up appointments were made and/or referrals ordered.     Positive Risk Factor Screenings with Interventions:            Hearing/Vision:  No exam data present  Hearing/Vision  Do you or your family notice any trouble with your hearing that hasn't been managed with hearing aids?: (!) Yes  Do you have difficulty driving, watching TV, or doing any of your daily activities because of your eyesight?: No  Have you had an eye exam within the past year?: (!) No  Hearing/Vision Interventions:  · Patient is somewhat hard of hearing and is going to schedule hearing evaluation. Also is advised to get an annual eye exam.    Safety:  Safety  Do you have working smoke detectors?: Yes  Have all throw rugs been removed or fastened?: (!) No  Do you have non-slip mats or surfaces in all bathtubs/showers?: (!) No  Do all of your stairways have a railing or banister?: Yes  Are your doorways, halls and stairs free of clutter?: Yes  Do you always fasten your seatbelt when you are in a car?: Yes  Safety Interventions:  · Home safety tips provided     Personalized Preventive Plan   Current Health Maintenance Status  Immunization History   Administered Date(s) Administered    COVID-19, Moderna, Booster, PF, 50mcg/0.25ml 10/28/2021    COVID-19, Pfizer, PF, 30mcg/0.3mL 01/22/2021, 02/12/2021    Influenza, High Dose (Fluzone 65 yrs and older) 09/25/2020    Influenza, Quadv, adjuvanted, 65 yrs +, IM, PF (Fluad) 11/05/2021    Pneumococcal Polysaccharide ([id number]) 09/25/2020    Td, unspecified formulation 06/05/2012        Health Maintenance   Topic Date Due    Shingles Vaccine (1 of 2) Never done    DTaP/Tdap/Td vaccine (1 - Tdap) 06/06/2012    Annual Wellness Visit (AWV)  11/24/2021    Potassium monitoring  10/22/2022    Creatinine monitoring  10/22/2022    PSA counseling  11/09/2022    Flu vaccine  Completed    Pneumococcal 65+ years Vaccine  Completed    COVID-19 Vaccine  Completed    Hepatitis A vaccine  Aged Out    Hepatitis B vaccine  Aged Out    Hib vaccine  Aged Out    Meningococcal (ACWY) vaccine  Aged Out     Recommendations for [employer] SARL Due: see orders and patient instructions/AVS.  . Recommended screening schedule for the next 5-10 years is provided to the patient in written form: see Patient Instructions/AVS.    Fariba Snyder was seen today for medicare awv.     Diagnoses and all orders for this visit:    Routine general medical examination at a health care facility

## 2021-11-30 NOTE — PATIENT INSTRUCTIONS
Personalized Preventive Plan for Terell Bird - 11/30/2021  Medicare offers a range of preventive health benefits. Some of the tests and screenings are paid in full while other may be subject to a deductible, co-insurance, and/or copay. Some of these benefits include a comprehensive review of your medical history including lifestyle, illnesses that may run in your family, and various assessments and screenings as appropriate. After reviewing your medical record and screening and assessments performed today your provider may have ordered immunizations, labs, imaging, and/or referrals for you. A list of these orders (if applicable) as well as your Preventive Care list are included within your After Visit Summary for your review. Other Preventive Recommendations:    · A preventive eye exam performed by an eye specialist is recommended every 1-2 years to screen for glaucoma; cataracts, macular degeneration, and other eye disorders. · A preventive dental visit is recommended every 6 months. · Try to get at least 150 minutes of exercise per week or 10,000 steps per day on a pedometer . · Order or download the FREE \"Exercise & Physical Activity: Your Everyday Guide\" from [employer]. Call 4-928.803.5907 or search [employer] online. · You need 4591-3047 mg of calcium and 4470-1220 IU of vitamin D per day. It is possible to meet your calcium requirement with diet alone, but a vitamin D supplement is usually necessary to meet this goal.  · When exposed to the sun, use a sunscreen that protects against both UVA and UVB radiation with an SPF of 30 or greater. Reapply every 2 to 3 hours or after sweating, drying off with a towel, or swimming. · Always wear a seat belt when traveling in a car. Always wear a helmet when riding a bicycle or motorcycle.

## 2022-01-01 ENCOUNTER — OFFICE VISIT (OUTPATIENT)
Dept: PRIMARY CARE CLINIC | Age: 82
End: 2022-01-01
Payer: MEDICARE

## 2022-01-01 ENCOUNTER — HOSPITAL ENCOUNTER (EMERGENCY)
Age: 82
End: 2022-12-07
Attending: EMERGENCY MEDICINE
Payer: MEDICARE

## 2022-01-01 VITALS
RESPIRATION RATE: 18 BRPM | HEIGHT: 72 IN | HEART RATE: 78 BPM | SYSTOLIC BLOOD PRESSURE: 138 MMHG | BODY MASS INDEX: 26.41 KG/M2 | TEMPERATURE: 97.8 F | DIASTOLIC BLOOD PRESSURE: 70 MMHG | WEIGHT: 195 LBS | OXYGEN SATURATION: 95 %

## 2022-01-01 DIAGNOSIS — Z23 NEED FOR VACCINATION FOR H FLU TYPE B: ICD-10-CM

## 2022-01-01 DIAGNOSIS — I10 ESSENTIAL HYPERTENSION: ICD-10-CM

## 2022-01-01 DIAGNOSIS — Z00.00 MEDICARE ANNUAL WELLNESS VISIT, SUBSEQUENT: Primary | ICD-10-CM

## 2022-01-01 DIAGNOSIS — G40.409 GRAND MAL SEIZURE DISORDER (HCC): ICD-10-CM

## 2022-01-01 DIAGNOSIS — I46.9 CARDIAC ARREST (HCC): Primary | ICD-10-CM

## 2022-01-01 DIAGNOSIS — Z79.01 ANTICOAGULATED: ICD-10-CM

## 2022-01-01 DIAGNOSIS — N40.0 BPH WITH ELEVATED PSA: ICD-10-CM

## 2022-01-01 DIAGNOSIS — R97.20 BPH WITH ELEVATED PSA: ICD-10-CM

## 2022-01-01 DIAGNOSIS — R05.9 COUGH, UNSPECIFIED TYPE: ICD-10-CM

## 2022-01-01 DIAGNOSIS — R56.9 GENERALIZED SEIZURE (HCC): ICD-10-CM

## 2022-01-01 DIAGNOSIS — M15.9 PRIMARY OSTEOARTHRITIS INVOLVING MULTIPLE JOINTS: ICD-10-CM

## 2022-01-01 DIAGNOSIS — E78.1 PURE HYPERTRIGLYCERIDEMIA: ICD-10-CM

## 2022-01-01 LAB
ALBUMIN SERPL-MCNC: 3.6 G/DL (ref 3.5–5.2)
ALP BLD-CCNC: 68 U/L (ref 40–129)
ALT SERPL-CCNC: 16 U/L (ref 0–40)
ANION GAP SERPL CALCULATED.3IONS-SCNC: 11 MMOL/L (ref 7–16)
AST SERPL-CCNC: 18 U/L (ref 0–39)
BASOPHILS ABSOLUTE: 0.03 E9/L (ref 0–0.2)
BASOPHILS RELATIVE PERCENT: 0.5 % (ref 0–2)
BILIRUB SERPL-MCNC: 0.6 MG/DL (ref 0–1.2)
BUN BLDV-MCNC: 17 MG/DL (ref 6–23)
CALCIUM SERPL-MCNC: 8.6 MG/DL (ref 8.6–10.2)
CHLORIDE BLD-SCNC: 105 MMOL/L (ref 98–107)
CHOLESTEROL, TOTAL: 139 MG/DL (ref 0–199)
CO2: 24 MMOL/L (ref 22–29)
CREAT SERPL-MCNC: 1.5 MG/DL (ref 0.7–1.2)
EOSINOPHILS ABSOLUTE: 0.14 E9/L (ref 0.05–0.5)
EOSINOPHILS RELATIVE PERCENT: 2.1 % (ref 0–6)
GFR AFRICAN AMERICAN: 54
GFR NON-AFRICAN AMERICAN: 45 ML/MIN/1.73
GLUCOSE BLD-MCNC: 91 MG/DL (ref 74–99)
HCT VFR BLD CALC: 41.3 % (ref 37–54)
HDLC SERPL-MCNC: 30 MG/DL
HEMOGLOBIN: 13.7 G/DL (ref 12.5–16.5)
IMMATURE GRANULOCYTES #: 0.03 E9/L
IMMATURE GRANULOCYTES %: 0.5 % (ref 0–5)
LDL CHOLESTEROL CALCULATED: 82 MG/DL (ref 0–99)
LYMPHOCYTES ABSOLUTE: 1.54 E9/L (ref 1.5–4)
LYMPHOCYTES RELATIVE PERCENT: 23.5 % (ref 20–42)
MCH RBC QN AUTO: 30.2 PG (ref 26–35)
MCHC RBC AUTO-ENTMCNC: 33.2 % (ref 32–34.5)
MCV RBC AUTO: 91.2 FL (ref 80–99.9)
MONOCYTES ABSOLUTE: 0.8 E9/L (ref 0.1–0.95)
MONOCYTES RELATIVE PERCENT: 12.2 % (ref 2–12)
NEUTROPHILS ABSOLUTE: 4.02 E9/L (ref 1.8–7.3)
NEUTROPHILS RELATIVE PERCENT: 61.2 % (ref 43–80)
PDW BLD-RTO: 14.4 FL (ref 11.5–15)
PLATELET # BLD: 159 E9/L (ref 130–450)
PMV BLD AUTO: 9.4 FL (ref 7–12)
POTASSIUM SERPL-SCNC: 4.8 MMOL/L (ref 3.5–5)
RBC # BLD: 4.53 E12/L (ref 3.8–5.8)
SARS-COV-2, PCR: NOT DETECTED
SODIUM BLD-SCNC: 140 MMOL/L (ref 132–146)
TOTAL PROTEIN: 6.4 G/DL (ref 6.4–8.3)
TRIGL SERPL-MCNC: 134 MG/DL (ref 0–149)
TSH SERPL DL<=0.05 MIU/L-ACNC: 1.03 UIU/ML (ref 0.27–4.2)
VLDLC SERPL CALC-MCNC: 27 MG/DL
WBC # BLD: 6.6 E9/L (ref 4.5–11.5)

## 2022-01-01 PROCEDURE — G0008 ADMIN INFLUENZA VIRUS VAC: HCPCS | Performed by: INTERNAL MEDICINE

## 2022-01-01 PROCEDURE — 2500000003 HC RX 250 WO HCPCS

## 2022-01-01 PROCEDURE — G0439 PPPS, SUBSEQ VISIT: HCPCS | Performed by: INTERNAL MEDICINE

## 2022-01-01 PROCEDURE — G8484 FLU IMMUNIZE NO ADMIN: HCPCS | Performed by: INTERNAL MEDICINE

## 2022-01-01 PROCEDURE — 1123F ACP DISCUSS/DSCN MKR DOCD: CPT | Performed by: INTERNAL MEDICINE

## 2022-01-01 PROCEDURE — 99283 EMERGENCY DEPT VISIT LOW MDM: CPT

## 2022-01-01 PROCEDURE — 90694 VACC AIIV4 NO PRSRV 0.5ML IM: CPT | Performed by: INTERNAL MEDICINE

## 2022-01-01 PROCEDURE — 31500 INSERT EMERGENCY AIRWAY: CPT

## 2022-01-01 PROCEDURE — 3078F DIAST BP <80 MM HG: CPT | Performed by: INTERNAL MEDICINE

## 2022-01-01 PROCEDURE — 2580000003 HC RX 258

## 2022-01-01 PROCEDURE — 3074F SYST BP LT 130 MM HG: CPT | Performed by: INTERNAL MEDICINE

## 2022-01-01 PROCEDURE — 6360000002 HC RX W HCPCS

## 2022-01-01 ASSESSMENT — PATIENT HEALTH QUESTIONNAIRE - PHQ9
SUM OF ALL RESPONSES TO PHQ9 QUESTIONS 1 & 2: 0
1. LITTLE INTEREST OR PLEASURE IN DOING THINGS: 0
SUM OF ALL RESPONSES TO PHQ QUESTIONS 1-9: 0
2. FEELING DOWN, DEPRESSED OR HOPELESS: 0
SUM OF ALL RESPONSES TO PHQ QUESTIONS 1-9: 0

## 2022-01-01 ASSESSMENT — LIFESTYLE VARIABLES
HOW OFTEN DO YOU HAVE A DRINK CONTAINING ALCOHOL: 2-4 TIMES A MONTH
HOW MANY STANDARD DRINKS CONTAINING ALCOHOL DO YOU HAVE ON A TYPICAL DAY: 1 OR 2

## 2022-01-10 RX ORDER — OMEPRAZOLE 40 MG/1
40 CAPSULE, DELAYED RELEASE ORAL DAILY
Qty: 90 CAPSULE | Refills: 3 | Status: SHIPPED | OUTPATIENT
Start: 2022-01-10

## 2022-01-25 ENCOUNTER — OFFICE VISIT (OUTPATIENT)
Dept: PRIMARY CARE CLINIC | Age: 82
End: 2022-01-25
Payer: MEDICARE

## 2022-01-25 VITALS
BODY MASS INDEX: 28.85 KG/M2 | RESPIRATION RATE: 18 BRPM | HEART RATE: 84 BPM | TEMPERATURE: 97.4 F | DIASTOLIC BLOOD PRESSURE: 80 MMHG | HEIGHT: 72 IN | SYSTOLIC BLOOD PRESSURE: 138 MMHG | WEIGHT: 213 LBS | OXYGEN SATURATION: 91 %

## 2022-01-25 DIAGNOSIS — M15.9 PRIMARY OSTEOARTHRITIS INVOLVING MULTIPLE JOINTS: ICD-10-CM

## 2022-01-25 DIAGNOSIS — G40.409 GRAND MAL SEIZURE DISORDER (HCC): ICD-10-CM

## 2022-01-25 DIAGNOSIS — R79.89 ELEVATED SERUM CREATININE: ICD-10-CM

## 2022-01-25 DIAGNOSIS — Z95.0 CARDIAC PACEMAKER IN SITU: ICD-10-CM

## 2022-01-25 DIAGNOSIS — I10 ESSENTIAL HYPERTENSION: Primary | ICD-10-CM

## 2022-01-25 DIAGNOSIS — Z86.79 HISTORY OF ATRIAL FIBRILLATION: ICD-10-CM

## 2022-01-25 DIAGNOSIS — N40.0 BPH WITH ELEVATED PSA: ICD-10-CM

## 2022-01-25 DIAGNOSIS — R60.0 EDEMA OF BOTH LOWER LEGS: ICD-10-CM

## 2022-01-25 DIAGNOSIS — Z79.01 ANTICOAGULATED: ICD-10-CM

## 2022-01-25 DIAGNOSIS — R97.20 BPH WITH ELEVATED PSA: ICD-10-CM

## 2022-01-25 PROCEDURE — 1036F TOBACCO NON-USER: CPT | Performed by: INTERNAL MEDICINE

## 2022-01-25 PROCEDURE — 4040F PNEUMOC VAC/ADMIN/RCVD: CPT | Performed by: INTERNAL MEDICINE

## 2022-01-25 PROCEDURE — G8484 FLU IMMUNIZE NO ADMIN: HCPCS | Performed by: INTERNAL MEDICINE

## 2022-01-25 PROCEDURE — 1123F ACP DISCUSS/DSCN MKR DOCD: CPT | Performed by: INTERNAL MEDICINE

## 2022-01-25 PROCEDURE — G8417 CALC BMI ABV UP PARAM F/U: HCPCS | Performed by: INTERNAL MEDICINE

## 2022-01-25 PROCEDURE — 99215 OFFICE O/P EST HI 40 MIN: CPT | Performed by: INTERNAL MEDICINE

## 2022-01-25 PROCEDURE — G8427 DOCREV CUR MEDS BY ELIG CLIN: HCPCS | Performed by: INTERNAL MEDICINE

## 2022-01-25 NOTE — PROGRESS NOTES
[facility]  1/25/22     Chief Complaint   Patient presents with    Hypertension     physical        Allergies   Allergen Reactions    Chicken Allergy     Shellfish-Derived Products     Turkey-Sweet Potatoes-Peaches [Alimentum]         Current Outpatient Medications   Medication Sig Dispense Refill    omeprazole (PRILOSEC) 40 MG delayed release capsule Take 1 capsule by mouth daily 90 capsule 3    levETIRAcetam (KEPPRA) 500 MG tablet Take 1 tablet by mouth twice daily 60 tablet 11    metoprolol succinate (TOPROL XL) 50 MG extended release tablet Take 1 tablet by mouth daily 90 tablet 3    apixaban (ELIQUIS) 5 MG TABS tablet Take 1 tablet by mouth 2 times daily 180 tablet 1    tamsulosin (FLOMAX) 0.4 MG capsule Take 0.4 mg by mouth daily      losartan (COZAAR) 50 MG tablet Take 1 tablet by mouth daily 90 tablet 3     No current facility-administered medications for this visit. HPI: Patient comes in for his annual physical. He is now 79years of age. Currently feels fairly good. He denies any chest pain or shortness of breath. His asthma has been stable. His seizure disorder has been stable on Keppra 500 mg twice daily. She remains on all of his other meds and supplements the same as listed on his med list, which was reviewed with him. His left hip arthroplasty is coming along and he admits he does not do his exercises as regularly as he should. He complains of persistent edema in his lower legs and feet mainly on the left side. He is concerned about his circulation. He follows up with his cardiac electrophysiologist in [place] for his pacemaker and history of atrial fibrillation for which she remains anticoagulated on Eliquis 5 mg twice daily. He has not had any bleeding issues. He also follows up with his urologist, Dr. Dylan Chapman, for management of his BPH with elevated PSA.     Review of Systems    General:   no weight change, no malaise, no fatigue, no change in appetite, no sleep disturbance, no fever/chills, no night sweats,  Skin:                no abnormal pigmentation, no rash, no scaling, no itching, no masses, no hair or nail changes  Eyes:               no blurring, no diplopia, no eye pain, no glaucoma, no cataracts  ENT:                 no hearing loss, no tinnitus, no vertigo, no nosebleed, no nasal congestion, no rhinorrhea, no sore throat, no jaw pain, no hoarseness,  no bleeding    Neck:     no node tenderness , not rigid, no masses   Respiratory:           no cough, no sputum, no coughing blood, no pleuritic , no chest pain, no dyspnea,  no wheezing  Cardiovascular:         no angina, no chest pain  No syncope, no pedal edema , no orthopnea, no PND, no palpitations, no claudication  Gastrointestinal  no nausea, no vomiting, no heartburn, no diarrhea, no constipation, no bloating, no abdominal pain, no rectal pain, no bleeding no hemorrhoids, no hernia. Genitourinary:            no urinary urgency, no frequency, no dysuria, no nocturia, no hesitancy, no  incontinence, no bleeding, no stones  Musculoskeletal:       Complains of left shoulder pain. Neurologic:                 no paralysis, no paresis, no  paresthesia, no seizures, no tremors, no headaches, no tumors , no stroke, no speech issues,  No incoordination, no head trauma, no memory loss/concentration  Hematologic:              no anemia, no abnormal bleeding / bruising, no fever, no chills, no night sweats, no wollen glands, no unexplained weight loss  Endocrine:        no heat or cold intolerance and no polyphagia, polydipsia,  or polyuria  Psych:  Denies anxiety or depression. Physical Exam:  Patient is an 79 y.o. male     Constitutional:  General Appearance: Well-appearing. Level of Distress: NAD. Ambulation: ambulating normally    Psychiatric:  Insight: good judgment: Mental status: normal mood and affect. Orientation: oriented to time place and person.   Memory: recent memory normal and remote memory normal.     Head: normocephalic and atraumatic. Eyes:  Lids and Conjunctiva: Non-injected and no pallor. Pupils: PERRLA, Corneas: grossly intact. EOMI, Lens: clear. Sclerae: non-icteric. Vision: peripheral vision grossly intact and acuity grossly intact. ENMT:  Ears: no lesions on external ear. TMs clear and TM mobility normal.  Hearing: no hearing loss. Nose: No lesions on external nose, septal deviation sinus tenderness or nasal discharge and nares patent and nasal passages clear. Lips, Teeth and Gums:  no mouth or lip ulcers or bleeding gums and normal dentition. Oropharynx: no erythema or exudates and moist mucous membranes and tonsils not enlarged. Neck:  Neck supple, FROM, trachea midline, and no masses. Lymph nodes: no cervical LAD, supraclavicular LAD, axillary LAD, or inguinal LAD. Thyroid: non-tender and no enlargement. Lungs:  Respiratory effort, no dyspnea. Auscultation: no rales/crackles or rhonchi and breath sounds normal, good air movement and CTA except as noted. Cardiovascular: Apical impulse:not displaced. Heart: Auscultation: normal S1 and S2, no murmurs, rubs or gallops; and RRR. Neck vessels: no carotid bruits. Pulses including femoral/pedal: normal throughout. Abdomen: Bowel sounds: normal.  Inspection and Palpation: no tenderness, guarding, masses, rebound tenderness or CVA tenderness and soft and non-distended. Liver: non-tender and no hepatomegaly. Spleen: non-tender and no splenomegaly. Hernia: none palpable. Musculoskeletal: Motor Strength and Tone: normal tone and motor strength. Joints, Bones and Muscles: no malalignment, tenderness or bony abnormalities and normal movement of all extremities. Extremities: 2+ pitting edema both lower legs and left foot. Pedal pulses somewhat diminished bilaterally. Neurologic:  Gait and Station: normal gait and station. Cranial nerves:grossly intact. No focal neurologic deficits noted.     Skin: Inspection and palpation: no rash, lesions, ulcer, induration, nodules, jaundice or abnormal nevi and good turgor. Nails: normal.     Back:  Thoracolumbar Appearance: normal curvature. I have examined the patient's feet and found no evidence of deformities, calluses, ulcerations, or evidence of neuropathy. Lab Results   Component Value Date    WBC 6.6 01/20/2022    HGB 13.7 01/20/2022    HCT 41.3 01/20/2022     01/20/2022    CHOL 139 01/20/2022    TRIG 134 01/20/2022    HDL 30 01/20/2022    ALT 16 01/20/2022    AST 18 01/20/2022    TSH 1.030 01/20/2022    PSA 12.84 (H) 11/09/2021    INR 1.3 05/09/2021    LABA1C 4.7 07/19/2021      Lab Results   Component Value Date     01/20/2022    K 4.8 01/20/2022     01/20/2022    CO2 24 01/20/2022    BUN 17 01/20/2022    CREATININE 1.5 (H) 01/20/2022    GLUCOSE 91 01/20/2022    CALCIUM 8.6 01/20/2022    PROT 6.4 01/20/2022    LABALBU 3.6 01/20/2022    BILITOT 0.6 01/20/2022    ALKPHOS 68 01/20/2022    AST 18 01/20/2022    ALT 16 01/20/2022    LABGLOM 45 01/20/2022    GFRAA 54 01/20/2022            Assessment:    Primary hypertension, fair control on current meds. History of atrial fibrillation, currently maintained in sinus rhythm and followed by his cardiac electrophysiologist.    Elevated serum creatinine    Anticoagulated on Eliquis with no reported bleeding issues. 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    Primary osteoarthritis involving multiple joints, status post with bilateral total hip arthroplasties. Cardiac pacemaker in situ, followed by his cardiac electrophysiologist.          Discussion Notes: He will continue all of his usual meds and supplements the same as listed on his med list. He is encouraged to try to follow a low-cholesterol, heart healthy diet and exercise as tolerated. He is advised to stay adequately hydrated.  We will schedule him for vascular testing to rule out arterial insufficiency in his lower extremities and evaluation of his persistent edema. Will make further recommendations depending on results. Otherwise he will follow-up with his neurologist for his seizure disorder and with his cardiac electrophysiologist as per their instructions. He will follow-up with his urologist for management of his BPH. We will repeat BMP in 1 month to monitor his elevated serum creatinine. Return here as needed or in 6 months for routine follow-up visit and repeat labs.

## 2022-02-02 ENCOUNTER — TELEPHONE (OUTPATIENT)
Dept: VASCULAR SURGERY | Age: 82
End: 2022-02-02

## 2022-02-02 NOTE — TELEPHONE ENCOUNTER
Called to confirm appointment for 2/3/22 at 9 a.m, left message with date, time, and phone number for patient.

## 2022-02-03 ENCOUNTER — OFFICE VISIT (OUTPATIENT)
Dept: VASCULAR SURGERY | Age: 82
End: 2022-02-03
Payer: MEDICARE

## 2022-02-03 ENCOUNTER — TELEPHONE (OUTPATIENT)
Dept: VASCULAR SURGERY | Age: 82
End: 2022-02-03

## 2022-02-03 VITALS — SYSTOLIC BLOOD PRESSURE: 108 MMHG | DIASTOLIC BLOOD PRESSURE: 76 MMHG

## 2022-02-03 DIAGNOSIS — M79.89 LEG SWELLING: ICD-10-CM

## 2022-02-03 DIAGNOSIS — I89.0 LYMPHEDEMA OF BOTH LOWER EXTREMITIES: ICD-10-CM

## 2022-02-03 DIAGNOSIS — R09.89 DECREASED DORSALIS PEDIS PULSE: ICD-10-CM

## 2022-02-03 PROCEDURE — G8484 FLU IMMUNIZE NO ADMIN: HCPCS | Performed by: SURGERY

## 2022-02-03 PROCEDURE — 1123F ACP DISCUSS/DSCN MKR DOCD: CPT | Performed by: SURGERY

## 2022-02-03 PROCEDURE — 1036F TOBACCO NON-USER: CPT | Performed by: SURGERY

## 2022-02-03 PROCEDURE — 99204 OFFICE O/P NEW MOD 45 MIN: CPT | Performed by: SURGERY

## 2022-02-03 PROCEDURE — G8427 DOCREV CUR MEDS BY ELIG CLIN: HCPCS | Performed by: SURGERY

## 2022-02-03 PROCEDURE — G8417 CALC BMI ABV UP PARAM F/U: HCPCS | Performed by: SURGERY

## 2022-02-03 PROCEDURE — 4040F PNEUMOC VAC/ADMIN/RCVD: CPT | Performed by: SURGERY

## 2022-02-03 RX ORDER — HYDROCHLOROTHIAZIDE 12.5 MG/1
12.5 CAPSULE, GELATIN COATED ORAL DAILY
COMMUNITY
End: 2022-03-25 | Stop reason: SDUPTHER

## 2022-02-03 NOTE — PROGRESS NOTES
Chief Complaint:   Chief Complaint   Patient presents with    Surgical Consult     Edema left leg. HPI: Patient came to the office, for evaluation of multiple symptoms involving both lower extremities, history of swelling of both legs, left leg more than right leg, since May of last year on and off, recently was seen by his PCP who referred the patient here for further evaluation    No history of any deep vein thrombosis    Patient does have some cardiac issues, insertion of a pacemaker, has been on anticoagulation, for cardiac arrhythmia, on Eliquis    Patient plays golf regularly, 4 times a week    Recently noted, some discomfort of the plantar surface left foot on and off for last few weeks, concerned      Patient denies any focal lateralizing neurological symptoms like loss of speech, vision or loss of function of extremity    Patient can walk a few blocks , and denies any symptoms of rest pain    Allergies   Allergen Reactions    Chicken Allergy     Shellfish-Derived Products     Turkey-Sweet Potatoes-Peaches [Alimentum]        Current Outpatient Medications   Medication Sig Dispense Refill    hydroCHLOROthiazide (MICROZIDE) 12.5 MG capsule Take 12.5 mg by mouth daily      Elastic Bandages & Supports (JOBST KNEE HIGH COMPRESSION ) MISC Knee high with 20- 30 mmhg of compression 1 each 10    omeprazole (PRILOSEC) 40 MG delayed release capsule Take 1 capsule by mouth daily 90 capsule 3    levETIRAcetam (KEPPRA) 500 MG tablet Take 1 tablet by mouth twice daily 60 tablet 11    metoprolol succinate (TOPROL XL) 50 MG extended release tablet Take 1 tablet by mouth daily 90 tablet 3    apixaban (ELIQUIS) 5 MG TABS tablet Take 1 tablet by mouth 2 times daily 180 tablet 1    tamsulosin (FLOMAX) 0.4 MG capsule Take 0.4 mg by mouth daily      losartan (COZAAR) 50 MG tablet Take 1 tablet by mouth daily 90 tablet 3     No current facility-administered medications for this visit.        Past Medical History:   Diagnosis Date    Aortic valve regurgitation     Arrhythmia     Atrial fibrillation (HCC)     Decreased dorsalis pedis pulse 2/3/2022    Dizziness     GERD (gastroesophageal reflux disease)     Heartburn     History of stress test     Hypertension     Leg swelling 2/3/2022    Lightheadedness     Lymphedema of both lower extremities 2/3/2022    Seizure ([address]     Shoulder pain        Past Surgical History:   Procedure Laterality Date    JOINT REPLACEMENT Bilateral     OTHER SURGICAL HISTORY      Ablation Cardiac    PACEMAKER PLACEMENT      TONSILLECTOMY         Family History   Problem Relation Age of Onset    Other Mother         brain aneurysm    Stroke Father     Other Father         pacemaker    Cancer Sister        Social History     Socioeconomic History    Marital status:      Spouse name: Not on file    Number of children: Not on file    Years of education: Not on file    Highest education level: Not on file   Occupational History    Not on file   Tobacco Use    Smoking status: Never Smoker    Smokeless tobacco: Never Used   Vaping Use    Vaping Use: Never used   Substance and Sexual Activity    Alcohol use: Yes     Alcohol/week: 2.0 standard drinks     Types: 1 Cans of beer, 1 Shots of liquor per week     Comment: 2 drinks 5 days a week    Drug use: No    Sexual activity: Not on file   Other Topics Concern    Not on file   Social History Narrative    Not on file     Social Determinants of Health     Financial Resource Strain: Low Risk     Difficulty of Paying Living Expenses: Not hard at all   Food Insecurity: No Food Insecurity    Worried About Running Out of Food in the Last Year: Never true    Artie of Food in the Last Year: Never true   Transportation Needs:     Lack of Transportation (Medical): Not on file    Lack of Transportation (Non-Medical):  Not on file   Physical Activity:     Days of Exercise per Week: Not on file    Minutes of Exercise per Session: Not on file   Stress:     Feeling of Stress : Not on file   Social Connections:     Frequency of Communication with Friends and Family: Not on file    Frequency of Social Gatherings with Friends and Family: Not on file    Attends [employer] Services: Not on file    Active Member of Clubs or Organizations: Not on file    Attends Club or Organization Meetings: Not on file    Marital Status: Not on file   Intimate Partner Violence:     Fear of Current or Ex-Partner: Not on file    Emotionally Abused: Not on file    Physically Abused: Not on file    Sexually Abused: Not on file   Housing Stability:     Unable to Pay for Housing in the Last Year: Not on file    Number of [place] in the Last Year: Not on file    Unstable Housing in the Last Year: Not on file       Review of Systems:  Skin:  No abnormal pigmentation or rash  Eyes:  No blurring, diplopia or vision loss  Ears/Nose/Throat:  No hearing loss or vertigo  Respiratory:  No cough, pleuritic chest pain, dyspnea, or wheezing. Cardiovascular: No angina, palpitations . Coronary artery disease with history of atrial fibrillation, status post insertion of a pacemaker  Gastrointestinal:  No nausea or vomiting; no abdominal pain or rectal bleeding  Musculoskeletal:  No arthritis or weakness. Neurologic:  No paralysis, paresis, paresthesia, seizures or headaches. History of seizure disorder  Hematologic/Lymphatic/Immunologic:  No anemia, abnormal bleeding/bruising, fever, chills or night sweats. Endocrine:  No heat or cold intolerance. No polyphagia, polydipsia or polyuria. Physical Exam:  General appearance:  Alert, awake, oriented x 3. No distress. Skin:  Warm and dry  Head:  Normocephalic.   No masses, lesions or tenderness  Eyes:  Conjunctivae appear normal; PERRL  Ears:  External ears normal  Nose/Sinuses:  Septum midline, mucosa normal; no drainage  Oropharynx:  Clear, no exudate noted  Neck:  No jugular venous distention, lymphadenopathy or thyromegaly. No evidence of carotid bruit  Lungs:  Clear to ausculation bilaterally. No rhonchi, crackles, wheezes  Heart:  Regular rate and rhythm. No rub or murmur  Abdomen:  Soft, non-tender. No masses, organomegaly. Musculoskeletal : No joint effusions, tenderness swelling    Neuro: Speech is intact. Moving all extremities. No focal motor or sensory deficits      Extremities:  Both feet are warm to touch. The color of both feet is normal.    Patient does have mild to moderate edema both legs, clinically suspicious for mild lymphedema without any calf tenderness, no varicose veins, no cellulitis    Pulses Right  Left    Brachial 3 3    Radial    3=normal   Femoral 2 2  2=diminished   Popliteal    1=barely palpable   Dorsalis pedis  1  0=absent   Posterior tibial 2   4=aneurysmal             Other pertinent information:1. The past medical records were reviewed. Assessment:    1. Leg swelling    2. Lymphedema of both lower extremities    3. Decreased dorsalis pedis pulse              Plan:       Discussed the patient, all options, risks benefits were explained, patient was reassured, was recommended work-up as outlined below, also to wear light over-the-counter compression stockings, if that is not helping, consider Jobst compression stockings, prescription was given and call me as needed if any increasing symptoms          Patient was instructed to continue walking program and to call if any worsening of symptoms and to call if any focal lateralizing neurological symptoms like loss of speech, vision or loss of function of extremity. All the questions were answered.       Orders Placed This Encounter   Procedures    VL TYLOR BILATERAL LIMITED 1-2 LEVELS    US DUP LOWER EXTREMITIES BILATERAL VENOUS     Orders Placed This Encounter   Medications    Elastic Bandages & Supports (JOBST KNEE HIGH COMPRESSION ) MISC     Sig: Knee high with 20- 30 mmhg of compression     Dispense:  1 each     Refill:  10           Indicated follow-up: Return if symptoms worsen or fail to improve.

## 2022-02-03 NOTE — TELEPHONE ENCOUNTER
Notified patient of testing at [facility] on Thursday, 3-24-22 at 7:30 am.  [place] at 7:00 am.

## 2022-02-17 DIAGNOSIS — R79.89 ELEVATED SERUM CREATININE: ICD-10-CM

## 2022-02-17 DIAGNOSIS — I10 ESSENTIAL HYPERTENSION: ICD-10-CM

## 2022-02-17 LAB
ALBUMIN SERPL-MCNC: 3.7 G/DL (ref 3.5–5.2)
ALP BLD-CCNC: 66 U/L (ref 40–129)
ALT SERPL-CCNC: 17 U/L (ref 0–40)
ANION GAP SERPL CALCULATED.3IONS-SCNC: 13 MMOL/L (ref 7–16)
AST SERPL-CCNC: 23 U/L (ref 0–39)
BILIRUB SERPL-MCNC: 0.6 MG/DL (ref 0–1.2)
BUN BLDV-MCNC: 17 MG/DL (ref 6–23)
CALCIUM SERPL-MCNC: 9.2 MG/DL (ref 8.6–10.2)
CHLORIDE BLD-SCNC: 104 MMOL/L (ref 98–107)
CO2: 24 MMOL/L (ref 22–29)
CREAT SERPL-MCNC: 1.4 MG/DL (ref 0.7–1.2)
GFR AFRICAN AMERICAN: 59
GFR NON-AFRICAN AMERICAN: 48 ML/MIN/1.73
GLUCOSE BLD-MCNC: 88 MG/DL (ref 74–99)
HCT VFR BLD CALC: 44 % (ref 37–54)
HEMOGLOBIN: 14.4 G/DL (ref 12.5–16.5)
MCH RBC QN AUTO: 29.9 PG (ref 26–35)
MCHC RBC AUTO-ENTMCNC: 32.7 % (ref 32–34.5)
MCV RBC AUTO: 91.5 FL (ref 80–99.9)
PDW BLD-RTO: 14.8 FL (ref 11.5–15)
PLATELET # BLD: 176 E9/L (ref 130–450)
PMV BLD AUTO: 9.6 FL (ref 7–12)
POTASSIUM SERPL-SCNC: 4.4 MMOL/L (ref 3.5–5)
RBC # BLD: 4.81 E12/L (ref 3.8–5.8)
SODIUM BLD-SCNC: 141 MMOL/L (ref 132–146)
TOTAL PROTEIN: 6.8 G/DL (ref 6.4–8.3)
WBC # BLD: 7.4 E9/L (ref 4.5–11.5)

## 2022-02-18 DIAGNOSIS — Z79.01 ANTICOAGULATED: ICD-10-CM

## 2022-02-18 DIAGNOSIS — E78.1 PURE HYPERTRIGLYCERIDEMIA: ICD-10-CM

## 2022-02-18 DIAGNOSIS — I10 ESSENTIAL HYPERTENSION: Primary | ICD-10-CM

## 2022-03-24 ENCOUNTER — HOSPITAL ENCOUNTER (OUTPATIENT)
Dept: ULTRASOUND IMAGING | Age: 82
Discharge: HOME | End: 2022-03-26
Payer: MEDICARE

## 2022-03-24 ENCOUNTER — HOSPITAL ENCOUNTER (OUTPATIENT)
Dept: INTERVENTIONAL RADIOLOGY/VASCULAR | Age: 82
Discharge: HOME | End: 2022-03-26
Payer: MEDICARE

## 2022-03-24 DIAGNOSIS — M79.89 LEG SWELLING: ICD-10-CM

## 2022-03-24 DIAGNOSIS — R09.89 DECREASED DORSALIS PEDIS PULSE: ICD-10-CM

## 2022-03-24 PROCEDURE — 93922 UPR/L XTREMITY ART 2 LEVELS: CPT

## 2022-03-24 PROCEDURE — 93970 EXTREMITY STUDY: CPT

## 2022-03-24 PROCEDURE — 93970 EXTREMITY STUDY: CPT | Performed by: RADIOLOGY

## 2022-03-25 RX ORDER — HYDROCHLOROTHIAZIDE 12.5 MG/1
12.5 CAPSULE, GELATIN COATED ORAL DAILY
Qty: 90 CAPSULE | Refills: 2 | Status: SHIPPED | OUTPATIENT
Start: 2022-03-25

## 2022-06-15 DIAGNOSIS — I10 ESSENTIAL HYPERTENSION: ICD-10-CM

## 2022-06-15 RX ORDER — LOSARTAN POTASSIUM 50 MG/1
50 TABLET ORAL DAILY
Qty: 90 TABLET | Refills: 3 | Status: SHIPPED | OUTPATIENT
Start: 2022-06-15

## 2022-06-24 ENCOUNTER — HOSPITAL ENCOUNTER (EMERGENCY)
Age: 82
Discharge: HOME | End: 2022-06-24
Attending: EMERGENCY MEDICINE
Payer: MEDICARE

## 2022-06-24 ENCOUNTER — APPOINTMENT (OUTPATIENT)
Dept: ULTRASOUND IMAGING | Age: 82
End: 2022-06-24
Payer: MEDICARE

## 2022-06-24 VITALS
TEMPERATURE: 97.7 F | RESPIRATION RATE: 16 BRPM | BODY MASS INDEX: 27.09 KG/M2 | HEART RATE: 72 BPM | HEIGHT: 72 IN | DIASTOLIC BLOOD PRESSURE: 81 MMHG | WEIGHT: 200 LBS | SYSTOLIC BLOOD PRESSURE: 143 MMHG | OXYGEN SATURATION: 98 %

## 2022-06-24 DIAGNOSIS — K80.20 CALCULUS OF GALLBLADDER WITHOUT CHOLECYSTITIS WITHOUT OBSTRUCTION: Primary | ICD-10-CM

## 2022-06-24 LAB
ALBUMIN SERPL-MCNC: 3.6 G/DL (ref 3.5–5.2)
ALP BLD-CCNC: 65 U/L (ref 40–129)
ALT SERPL-CCNC: 18 U/L (ref 0–40)
ANION GAP SERPL CALCULATED.3IONS-SCNC: 10 MMOL/L (ref 7–16)
AST SERPL-CCNC: 28 U/L (ref 0–39)
BACTERIA: NORMAL /HPF
BILIRUB SERPL-MCNC: 0.7 MG/DL (ref 0–1.2)
BILIRUBIN DIRECT: <0.2 MG/DL (ref 0–0.3)
BILIRUBIN URINE: NEGATIVE
BILIRUBIN, INDIRECT: NORMAL MG/DL (ref 0–1)
BLOOD, URINE: NORMAL
BUN BLDV-MCNC: 15 MG/DL (ref 6–23)
CALCIUM SERPL-MCNC: 9.1 MG/DL (ref 8.6–10.2)
CHLORIDE BLD-SCNC: 103 MMOL/L (ref 98–107)
CLARITY: CLEAR
CO2: 25 MMOL/L (ref 22–29)
COLOR: YELLOW
CREAT SERPL-MCNC: 1.2 MG/DL (ref 0.7–1.2)
EKG ATRIAL RATE: 74 BPM
EKG P-R INTERVAL: 224 MS
EKG Q-T INTERVAL: 390 MS
EKG QRS DURATION: 134 MS
EKG QTC CALCULATION (BAZETT): 435 MS
EKG R AXIS: -64 DEGREES
EKG T AXIS: 19 DEGREES
EKG VENTRICULAR RATE: 75 BPM
GFR AFRICAN AMERICAN: >60
GFR NON-AFRICAN AMERICAN: 58 ML/MIN/1.73
GLUCOSE BLD-MCNC: 110 MG/DL (ref 74–99)
GLUCOSE URINE: NEGATIVE MG/DL
HCT VFR BLD CALC: 41.1 % (ref 37–54)
HEMOGLOBIN: 14.3 G/DL (ref 12.5–16.5)
KETONES, URINE: NEGATIVE MG/DL
LEUKOCYTE ESTERASE, URINE: NEGATIVE
LIPASE: 24 U/L (ref 13–60)
MCH RBC QN AUTO: 31.5 PG (ref 26–35)
MCHC RBC AUTO-ENTMCNC: 34.8 % (ref 32–34.5)
MCV RBC AUTO: 90.5 FL (ref 80–99.9)
NITRITE, URINE: NEGATIVE
PDW BLD-RTO: 13.3 FL (ref 11.5–15)
PH UA: 6 (ref 5–9)
PLATELET # BLD: 152 E9/L (ref 130–450)
PMV BLD AUTO: 8.6 FL (ref 7–12)
POTASSIUM SERPL-SCNC: 4.1 MMOL/L (ref 3.5–5)
PROTEIN UA: NEGATIVE MG/DL
RBC # BLD: 4.54 E12/L (ref 3.8–5.8)
RBC UA: NORMAL /HPF (ref 0–2)
SODIUM BLD-SCNC: 138 MMOL/L (ref 132–146)
SPECIFIC GRAVITY UA: 1.01 (ref 1–1.03)
TOTAL PROTEIN: 6.6 G/DL (ref 6.4–8.3)
TROPONIN, HIGH SENSITIVITY: 10 NG/L (ref 0–11)
UROBILINOGEN, URINE: 0.2 E.U./DL
WBC # BLD: 7.6 E9/L (ref 4.5–11.5)
WBC UA: NORMAL /HPF (ref 0–5)

## 2022-06-24 PROCEDURE — 80076 HEPATIC FUNCTION PANEL: CPT

## 2022-06-24 PROCEDURE — 76705 ECHO EXAM OF ABDOMEN: CPT

## 2022-06-24 PROCEDURE — 84484 ASSAY OF TROPONIN QUANT: CPT

## 2022-06-24 PROCEDURE — 99284 EMERGENCY DEPT VISIT MOD MDM: CPT

## 2022-06-24 PROCEDURE — 81001 URINALYSIS AUTO W/SCOPE: CPT

## 2022-06-24 PROCEDURE — 83690 ASSAY OF LIPASE: CPT

## 2022-06-24 PROCEDURE — 36415 COLL VENOUS BLD VENIPUNCTURE: CPT

## 2022-06-24 PROCEDURE — 80048 BASIC METABOLIC PNL TOTAL CA: CPT

## 2022-06-24 PROCEDURE — 85027 COMPLETE CBC AUTOMATED: CPT

## 2022-06-24 PROCEDURE — 93005 ELECTROCARDIOGRAM TRACING: CPT | Performed by: PHYSICIAN ASSISTANT

## 2022-06-24 RX ORDER — SODIUM CHLORIDE 0.9 % (FLUSH) 0.9 %
10 SYRINGE (ML) INJECTION PRN
Status: DISCONTINUED | OUTPATIENT
Start: 2022-06-24 | End: 2022-06-24 | Stop reason: HOSPADM

## 2022-06-24 ASSESSMENT — PAIN SCALES - GENERAL: PAINLEVEL_OUTOF10: 7

## 2022-06-24 ASSESSMENT — PAIN DESCRIPTION - LOCATION: LOCATION: ABDOMEN

## 2022-06-24 ASSESSMENT — PAIN - FUNCTIONAL ASSESSMENT: PAIN_FUNCTIONAL_ASSESSMENT: 0-10

## 2022-06-24 ASSESSMENT — PAIN DESCRIPTION - ORIENTATION: ORIENTATION: MID

## 2022-06-24 NOTE — ED PROVIDER NOTES
ED Attending Shared Visit: CC: [facility]  Department of Emergency Medicine   ED  Encounter Note  Admit Date/RoomTime: 2022  8:33 AM  ED Room:     NAME: Rodríguez Obrien  : 1940  MRN: [id number]     Chief Complaint:  Abdominal Pain (mid abd pain since last night, denies n/v/d)    History of Present Illness        Rodríguez Obrien is a 79 y.o. old male who is on Eliquis long-term secondary to atrial fibs and having indwelling cardiac pacemaker who is followed by urology and underwent CT scan of the abdomen pelvis on Tuesday at [address] [place] [address] who presents to the emergency department by private vehicle, for sudden onset, still present aching pain in the epigastrium without radiation which began last night. There has been no similar episodes in the past and he still has slight discomfort. Since onset the symptoms have been improving. The pain is associated with no additional symptoms. The pain is aggravated by certain movements, pressure on area of discomfort and recumbency and relieved by sitting up. There has been NO back pain, chest pain, shortness of breath, fever, chills, vomiting, anorexia, diarrhea, dark/black stools, blood in stool, cloudy urine, urinary frequency, dysuria, hematuria or urinary urgency. Attempt is made to retrieve outpatient CT abdomen pelvis from [facility] while patient is having testing performed in the ED. ROS   Pertinent positives and negatives are stated within HPI, all other systems reviewed and are negative. Past Medical History:  has a past medical history of Aortic valve regurgitation, Arrhythmia, Atrial fibrillation (HCC), Decreased dorsalis pedis pulse, Dizziness, GERD (gastroesophageal reflux disease), Heartburn, History of stress test, Hypertension, Leg swelling, Lightheadedness, Lymphedema of both lower extremities, Seizure ([address] and Shoulder pain.     Surgical History:  has a past surgical history that includes Tonsillectomy; pacemaker placement; other surgical history; and joint replacement (Bilateral). Social History:  reports that he has never smoked. He has never used smokeless tobacco. He reports current alcohol use of about 2.0 standard drinks of alcohol per week. He reports that he does not use drugs. Family History: family history includes Cancer in his sister; Other in his father and mother; Stroke in his father. Allergies: Chicken allergy, Shellfish-derived products, and Turkey-sweet potatoes-peaches [alimentum]    Physical Exam   Oxygen Saturation Interpretation: Normal.        ED Triage Vitals [06/24/22 0831]   BP Temp Temp Source Heart Rate Resp SpO2 Height Weight   (!) 196/93 97.7 °F (36.5 °C) Oral 79 16 97 % 6' (1.829 m) 200 lb (90.7 kg)       Physical Exam  General Appearance/Constitutional:  Alert, development consistent with age. HEENT:  NC/NT. PERRLA. Airway patent. Neck:  Supple. No lymphadenopathy. Respiratory: Lungs Clear to auscultation and breath sounds equal.  CV:  Regular rate and rhythm. GI:  normal appearing, non-distended with no visible hernias. Bowel sounds: normal bowel sounds. Tenderness: There is mild tenderness present - located in the epigastrium. , There is no rebound tenderness. , There is no guarding. , There is no distension. , There is no pulsatile mass. .           Liver: non-palpable. Spleen:  non-palpable. Back: CVA Tenderness: No CVA tenderness. Integument:  Normal turgor. Warm, dry, without visible rash, unless noted elsewhere. Neurological:  Orientation age-appropriate. Motor functions intact.     Lab / Imaging Results   (All laboratory and radiology results have been personally reviewed by myself)  Labs:  Results for orders placed or performed during the hospital encounter of 31/07/27   Basic metabolic panel   Result Value Ref Range    Sodium 138 132 - 146 mmol/L    Potassium 4.1 3.5 - 5.0 mmol/L    Chloride 103 98 - 107 mmol/L    CO2 25 22 - 29 mmol/L    Anion Gap 10 7 - 16 mmol/L    Glucose 110 (H) 74 - 99 mg/dL    BUN 15 6 - 23 mg/dL    CREATININE 1.2 0.7 - 1.2 mg/dL    GFR Non-African American 58 >=60 mL/min/1.73    GFR African American >60     Calcium 9.1 8.6 - 10.2 mg/dL   CBC   Result Value Ref Range    WBC 7.6 4.5 - 11.5 E9/L    RBC 4.54 3.80 - 5.80 E12/L    Hemoglobin 14.3 12.5 - 16.5 g/dL    Hematocrit 41.1 37.0 - 54.0 %    MCV 90.5 80.0 - 99.9 fL    MCH 31.5 26.0 - 35.0 pg    MCHC 34.8 (H) 32.0 - 34.5 %    RDW 13.3 11.5 - 15.0 fL    Platelets 887 496 - 856 E9/L    MPV 8.6 7.0 - 12.0 fL   Troponin   Result Value Ref Range    Troponin, High Sensitivity 10 0 - 11 ng/L   Lipase   Result Value Ref Range    Lipase 24 13 - 60 U/L   Hepatic Function Panel   Result Value Ref Range    Total Protein 6.6 6.4 - 8.3 g/dL    Albumin 3.6 3.5 - 5.2 g/dL    Alkaline Phosphatase 65 40 - 129 U/L    ALT 18 0 - 40 U/L    AST 28 0 - 39 U/L    Total Bilirubin 0.7 0.0 - 1.2 mg/dL    Bilirubin, Direct <0.2 0.0 - 0.3 mg/dL    Bilirubin, Indirect see below 0.0 - 1.0 mg/dL   Urinalysis   Result Value Ref Range    Color, UA Yellow Straw/Yellow    Clarity, UA Clear Clear    Glucose, Ur Negative Negative mg/dL    Bilirubin Urine Negative Negative    Ketones, Urine Negative Negative mg/dL    Specific Gravity, UA 1.010 1.005 - 1.030    Blood, Urine TRACE-LYSED Negative    pH, UA 6.0 5.0 - 9.0    Protein, UA Negative Negative mg/dL    Urobilinogen, Urine 0.2 <2.0 E.U./dL    Nitrite, Urine Negative Negative    Leukocyte Esterase, Urine Negative Negative   Microscopic Urinalysis   Result Value Ref Range    WBC, UA NONE 0 - 5 /HPF    RBC, UA 0-1 0 - 2 /HPF    Bacteria, UA NONE SEEN None Seen /HPF   EKG 12 Lead   Result Value Ref Range    Ventricular Rate 75 BPM    Atrial Rate 74 BPM    P-R Interval 224 ms    QRS Duration 134 ms    Q-T Interval 390 ms    QTc Calculation (Bazett) 435 ms    R Axis -64 degrees    T Axis 19 degrees     Imaging:   All Radiology results interpreted by Radiologist unless otherwise noted. US GALLBLADDER RUQ   Final Result   1. Cholelithiasis without evidence of acute cholecystitis. 2. Fatty liver with multiple cysts in the left hepatic lobe. ED Course / Medical Decision Making     Medications   sodium chloride flush 0.9 % injection 10 mL (has no administration in time range)        Re-Evaluations:  6/24/22      Time: 1130    Patients condition remains stable, abd soft and NT. Consultations:             None    Procedures:   none    MDM: Patient presents with epigastric discomfort out of nowhere over the past 1 day. Ultrasound and previous CT demonstrate gallstones without the evidence of acute cholecystitis. Lab work negative. Patient is relatively comfortable. His wife was a former patient of Dr. Alan Strong and wishes to follow with him. Patient will be given information to contact the office for outpatient follow-up and instructions regarding gallbladder disease. Counseled regarding todays diagnosis, including possible risks and complications,  especially if left uncontrolled. Counseled regarding the possible side effects, risks, benefits and alternatives to treatment; patient and/or guardian verbalizes understanding, agrees, feels comfortable with and wishes to proceed with treatment plan. Advised patient to call her primary care physician with any new medication issues, and read all Rx info from pharmacy to assure aware of all possible risks and side effects of medication before taking. I did discuss warning signs for when to return to the Emergency Room, and the patient verbalized understanding    Plan of Care/Counseling:  Rosamond Sandhoff, [place]  reviewed today's visit with the patient and spouse / life partner in addition to providing specific details for the plan of care and counseling regarding the diagnosis and prognosis.   Questions are answered at this time and are agreeable with the plan.    Assessment      1. Calculus of gallbladder without cholecystitis without obstruction      This patient's ED course included: a personal history and physicial examination and re-evaluation prior to disposition  This patient has remained hemodynamically stable during their ED course. Plan   Discharged home  Patient condition is good. New Medications     New Prescriptions    No medications on file     Electronically signed by INDER Rayo   DD: 6/24/22  **This report was transcribed using voice recognition software. Every effort was made to ensure accuracy; however, inadvertent computerized transcription errors may be present.   END OF PROVIDER NOTE       [facility] [place], [place]  06/24/22 1142

## 2022-07-21 DIAGNOSIS — Z79.01 ANTICOAGULATED: ICD-10-CM

## 2022-07-21 DIAGNOSIS — I10 ESSENTIAL HYPERTENSION: ICD-10-CM

## 2022-07-21 DIAGNOSIS — E78.1 PURE HYPERTRIGLYCERIDEMIA: ICD-10-CM

## 2022-07-21 LAB
ALBUMIN SERPL-MCNC: 3.9 G/DL (ref 3.5–5.2)
ALP BLD-CCNC: 63 U/L (ref 40–129)
ALT SERPL-CCNC: 16 U/L (ref 0–40)
ANION GAP SERPL CALCULATED.3IONS-SCNC: 10 MMOL/L (ref 7–16)
AST SERPL-CCNC: 20 U/L (ref 0–39)
BILIRUB SERPL-MCNC: 0.8 MG/DL (ref 0–1.2)
BUN BLDV-MCNC: 22 MG/DL (ref 6–23)
CALCIUM SERPL-MCNC: 9 MG/DL (ref 8.6–10.2)
CHLORIDE BLD-SCNC: 104 MMOL/L (ref 98–107)
CHOLESTEROL, TOTAL: 156 MG/DL (ref 0–199)
CO2: 25 MMOL/L (ref 22–29)
CREAT SERPL-MCNC: 1.5 MG/DL (ref 0.7–1.2)
GFR AFRICAN AMERICAN: 54
GFR NON-AFRICAN AMERICAN: 45 ML/MIN/1.73
GLUCOSE BLD-MCNC: 106 MG/DL (ref 74–99)
HCT VFR BLD CALC: 41.1 % (ref 37–54)
HDLC SERPL-MCNC: 31 MG/DL
HEMOGLOBIN: 13.8 G/DL (ref 12.5–16.5)
LDL CHOLESTEROL CALCULATED: 93 MG/DL (ref 0–99)
MCH RBC QN AUTO: 31.8 PG (ref 26–35)
MCHC RBC AUTO-ENTMCNC: 33.6 % (ref 32–34.5)
MCV RBC AUTO: 94.7 FL (ref 80–99.9)
PDW BLD-RTO: 13.9 FL (ref 11.5–15)
PLATELET # BLD: 161 E9/L (ref 130–450)
PMV BLD AUTO: 9.5 FL (ref 7–12)
POTASSIUM SERPL-SCNC: 4.6 MMOL/L (ref 3.5–5)
RBC # BLD: 4.34 E12/L (ref 3.8–5.8)
SODIUM BLD-SCNC: 139 MMOL/L (ref 132–146)
TOTAL PROTEIN: 6.9 G/DL (ref 6.4–8.3)
TRIGL SERPL-MCNC: 162 MG/DL (ref 0–149)
VLDLC SERPL CALC-MCNC: 32 MG/DL
WBC # BLD: 6.7 E9/L (ref 4.5–11.5)

## 2022-07-26 ENCOUNTER — OFFICE VISIT (OUTPATIENT)
Dept: PRIMARY CARE CLINIC | Age: 82
End: 2022-07-26
Payer: MEDICARE

## 2022-07-26 VITALS
DIASTOLIC BLOOD PRESSURE: 60 MMHG | RESPIRATION RATE: 16 BRPM | HEIGHT: 72 IN | BODY MASS INDEX: 27.77 KG/M2 | TEMPERATURE: 97.2 F | HEART RATE: 81 BPM | WEIGHT: 205 LBS | OXYGEN SATURATION: 100 % | SYSTOLIC BLOOD PRESSURE: 120 MMHG

## 2022-07-26 DIAGNOSIS — N40.0 BPH WITH ELEVATED PSA: ICD-10-CM

## 2022-07-26 DIAGNOSIS — Z95.0 CARDIAC PACEMAKER IN SITU: ICD-10-CM

## 2022-07-26 DIAGNOSIS — I10 ESSENTIAL HYPERTENSION: Primary | ICD-10-CM

## 2022-07-26 DIAGNOSIS — M15.9 PRIMARY OSTEOARTHRITIS INVOLVING MULTIPLE JOINTS: ICD-10-CM

## 2022-07-26 DIAGNOSIS — M79.89 LEG SWELLING: ICD-10-CM

## 2022-07-26 DIAGNOSIS — G40.409 GRAND MAL SEIZURE DISORDER (HCC): ICD-10-CM

## 2022-07-26 DIAGNOSIS — Z86.79 HISTORY OF ATRIAL FIBRILLATION: ICD-10-CM

## 2022-07-26 DIAGNOSIS — K80.20 CALCULUS OF GALLBLADDER WITHOUT CHOLECYSTITIS WITHOUT OBSTRUCTION: ICD-10-CM

## 2022-07-26 DIAGNOSIS — R97.20 BPH WITH ELEVATED PSA: ICD-10-CM

## 2022-07-26 DIAGNOSIS — E78.1 PURE HYPERTRIGLYCERIDEMIA: ICD-10-CM

## 2022-07-26 DIAGNOSIS — Z79.01 ANTICOAGULATED: ICD-10-CM

## 2022-07-26 DIAGNOSIS — R79.89 ELEVATED SERUM CREATININE: ICD-10-CM

## 2022-07-26 PROCEDURE — 99214 OFFICE O/P EST MOD 30 MIN: CPT | Performed by: INTERNAL MEDICINE

## 2022-07-26 PROCEDURE — G8427 DOCREV CUR MEDS BY ELIG CLIN: HCPCS | Performed by: INTERNAL MEDICINE

## 2022-07-26 PROCEDURE — 1036F TOBACCO NON-USER: CPT | Performed by: INTERNAL MEDICINE

## 2022-07-26 PROCEDURE — 1123F ACP DISCUSS/DSCN MKR DOCD: CPT | Performed by: INTERNAL MEDICINE

## 2022-07-26 PROCEDURE — G8417 CALC BMI ABV UP PARAM F/U: HCPCS | Performed by: INTERNAL MEDICINE

## 2022-07-26 SDOH — ECONOMIC STABILITY: FOOD INSECURITY: WITHIN THE PAST 12 MONTHS, YOU WORRIED THAT YOUR FOOD WOULD RUN OUT BEFORE YOU GOT MONEY TO BUY MORE.: NEVER TRUE

## 2022-07-26 SDOH — ECONOMIC STABILITY: FOOD INSECURITY: WITHIN THE PAST 12 MONTHS, THE FOOD YOU BOUGHT JUST DIDN'T LAST AND YOU DIDN'T HAVE MONEY TO GET MORE.: NEVER TRUE

## 2022-07-26 ASSESSMENT — PATIENT HEALTH QUESTIONNAIRE - PHQ9
2. FEELING DOWN, DEPRESSED OR HOPELESS: 0
SUM OF ALL RESPONSES TO PHQ QUESTIONS 1-9: 0
SUM OF ALL RESPONSES TO PHQ9 QUESTIONS 1 & 2: 0
SUM OF ALL RESPONSES TO PHQ QUESTIONS 1-9: 0
SUM OF ALL RESPONSES TO PHQ QUESTIONS 1-9: 0
1. LITTLE INTEREST OR PLEASURE IN DOING THINGS: 0
SUM OF ALL RESPONSES TO PHQ QUESTIONS 1-9: 0

## 2022-07-26 ASSESSMENT — SOCIAL DETERMINANTS OF HEALTH (SDOH): HOW HARD IS IT FOR YOU TO PAY FOR THE VERY BASICS LIKE FOOD, HOUSING, MEDICAL CARE, AND HEATING?: NOT HARD AT ALL

## 2022-07-26 NOTE — PROGRESS NOTES
Bruce Yost  7/26/22     Chief Complaint   Patient presents with    Hypertension     6 months w labs         Allergies   Allergen Reactions    Chicken Allergy     Shellfish-Derived Products     Turkey-Sweet Potatoes-Peaches [Alimentum]         Current Outpatient Medications   Medication Sig Dispense Refill    losartan (COZAAR) 50 MG tablet Take 1 tablet by mouth daily 90 tablet 3    hydroCHLOROthiazide (MICROZIDE) 12.5 MG capsule Take 1 capsule by mouth daily 90 capsule 2    apixaban (ELIQUIS) 5 MG TABS tablet Take 1 tablet by mouth 2 times daily 180 tablet 1    Elastic Bandages & Supports (JOBST KNEE HIGH COMPRESSION SM) MISC Knee high with 20- 30 mmhg of compression 1 each 10    omeprazole (PRILOSEC) 40 MG delayed release capsule Take 1 capsule by mouth daily 90 capsule 3    levETIRAcetam (KEPPRA) 500 MG tablet Take 1 tablet by mouth twice daily 60 tablet 11    metoprolol succinate (TOPROL XL) 50 MG extended release tablet Take 1 tablet by mouth daily 90 tablet 3    tamsulosin (FLOMAX) 0.4 MG capsule Take 0.4 mg by mouth daily       No current facility-administered medications for this visit. HPI: Patient comes in for routine 6-month follow-up visit and for review labs. Currently feels well. He has not had any seizures. He follows up with his neurologist for his history of grand mal seizure and remains on Keppra 500 mg twice daily and seems to be tolerating it well. He also remains on his other meds including metoprolol succinate 50 mg daily, losartan 50 mg daily, HCTZ 12.5 mg daily, Eliquis 5 mg twice daily, omeprazole 40 mg daily. He tries to follow a heart healthy diet. He tries to stay physically active but does not exercise on a regular basis. He follows up with Dr. Reshma Lundy, his urologist for his BPH and elevated PSA. He is declining prostate biopsy at this time.   Also he was recently in the ER on 6/24/2022 with abdominal pain and CAT scan of the abdomen revealed some gallstones for which he saw Dr. Nadir Fonseca, general surgeon. He is getting cardiac clearance in anticipation for laparoscopic cholecystectomy. Currently denies any chest pain or shortness of breath. Review of Systems: as per HPI      Physical Exam:    Patient is a 79 y.o. male. Patient appears to be in no distress. Breathing comfortably. Ambulates without assistance. HEENT: normal.  Neck supple, no adenopathy or bruits. Heart RR, no MGR. Lungs clear. Abd: normal  Ext: no edema. Peripheral pulses: normal.  No neurologic deficits noted. Lab Results   Component Value Date    WBC 6.7 07/21/2022    HGB 13.8 07/21/2022    HCT 41.1 07/21/2022     07/21/2022    CHOL 156 07/21/2022    TRIG 162 (H) 07/21/2022    HDL 31 07/21/2022    ALT 16 07/21/2022    AST 20 07/21/2022    TSH 1.030 01/20/2022    PSA 15.21 (H) 05/24/2022    INR 1.3 05/09/2021    LABA1C 4.7 07/19/2021      Lab Results   Component Value Date     07/21/2022    K 4.6 07/21/2022     07/21/2022    CO2 25 07/21/2022    BUN 22 07/21/2022    CREATININE 1.5 (H) 07/21/2022    GLUCOSE 106 (H) 07/21/2022    CALCIUM 9.0 07/21/2022    PROT 6.9 07/21/2022    LABALBU 3.9 07/21/2022    BILITOT 0.8 07/21/2022    ALKPHOS 63 07/21/2022    AST 20 07/21/2022    ALT 16 07/21/2022    LABGLOM 45 07/21/2022    GFRAA 54 07/21/2022            Assessment:    Essential hypertension, controlled on current meds. -     Comprehensive Metabolic Panel; Future  -     TSH; Future    Calculus of gallbladder without cholecystitis without obstruction, recently diagnosed and currently being evaluated by general surgery. Awaiting cardiac clearance. Pure hypertriglyceridemia, fair control on no meds currently with a triglyceride level 162. -     Lipid Panel; Future    Anticoagulated on Eliquis with no reported bleeding issues. -     CBC; Future    Cardiac pacemaker in situ currently stable and followed by cardiology.     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 Primary osteoarthritis involving multiple joints    BPH with elevated PSA, followed by his urologist.    Chronically elevated serum creatinine from last visit. History of atrial fibrillation    Mild chronic edema both lower legs, unchanged since last visit. Discussion Notes: He will continue all of his current meds and supplements the same as listed on his med list.  He will follow-up with his cardiologist and urologist and neurologist as per their instructions. He is medically stable for cholecystectomy. Otherwise return here as needed or in about 5 months for his annual wellness visit.

## 2022-07-27 PROBLEM — K80.20 CALCULUS OF GALLBLADDER WITHOUT CHOLECYSTITIS WITHOUT OBSTRUCTION: Status: ACTIVE | Noted: 2022-01-01

## 2022-07-27 PROBLEM — R09.89 DECREASED DORSALIS PEDIS PULSE: Status: RESOLVED | Noted: 2022-02-03 | Resolved: 2022-07-27

## 2022-09-09 ENCOUNTER — HOSPITAL ENCOUNTER (OUTPATIENT)
Age: 82
Discharge: HOME | End: 2022-09-11

## 2022-09-09 LAB
HCT VFR BLD CALC: 41 % (ref 37–54)
HEMOGLOBIN: 14.3 G/DL (ref 12.5–16.5)
MCH RBC QN AUTO: 32.1 PG (ref 26–35)
MCHC RBC AUTO-ENTMCNC: 34.9 % (ref 32–34.5)
MCV RBC AUTO: 91.9 FL (ref 80–99.9)
PDW BLD-RTO: 14.1 FL (ref 11.5–15)
PLATELET # BLD: 134 E9/L (ref 130–450)
PMV BLD AUTO: 9.2 FL (ref 7–12)
RBC # BLD: 4.46 E12/L (ref 3.8–5.8)
WBC # BLD: 5.6 E9/L (ref 4.5–11.5)

## 2022-09-09 PROCEDURE — 88304 TISSUE EXAM BY PATHOLOGIST: CPT

## 2022-09-09 PROCEDURE — 85027 COMPLETE CBC AUTOMATED: CPT

## 2022-10-10 RX ORDER — METOPROLOL SUCCINATE 50 MG/1
50 TABLET, EXTENDED RELEASE ORAL DAILY
Qty: 90 TABLET | Refills: 3 | Status: SHIPPED | OUTPATIENT
Start: 2022-10-10

## 2022-10-18 ENCOUNTER — OFFICE VISIT (OUTPATIENT)
Dept: FAMILY MEDICINE CLINIC | Age: 82
End: 2022-10-18
Payer: MEDICARE

## 2022-10-18 VITALS
BODY MASS INDEX: 27.36 KG/M2 | TEMPERATURE: 98.3 F | WEIGHT: 202 LBS | HEART RATE: 78 BPM | OXYGEN SATURATION: 98 % | DIASTOLIC BLOOD PRESSURE: 64 MMHG | RESPIRATION RATE: 20 BRPM | SYSTOLIC BLOOD PRESSURE: 130 MMHG | HEIGHT: 72 IN

## 2022-10-18 DIAGNOSIS — R09.81 NASAL CONGESTION: ICD-10-CM

## 2022-10-18 DIAGNOSIS — Z20.822 SUSPECTED COVID-19 VIRUS INFECTION: Primary | ICD-10-CM

## 2022-10-18 DIAGNOSIS — R05.9 COUGH, UNSPECIFIED TYPE: ICD-10-CM

## 2022-10-18 LAB
Lab: NORMAL
PERFORMING INSTRUMENT: NORMAL
QC PASS/FAIL: NORMAL
SARS-COV-2, POC: NORMAL

## 2022-10-18 PROCEDURE — 1123F ACP DISCUSS/DSCN MKR DOCD: CPT | Performed by: PHYSICIAN ASSISTANT

## 2022-10-18 PROCEDURE — G8427 DOCREV CUR MEDS BY ELIG CLIN: HCPCS | Performed by: PHYSICIAN ASSISTANT

## 2022-10-18 PROCEDURE — 99203 OFFICE O/P NEW LOW 30 MIN: CPT | Performed by: PHYSICIAN ASSISTANT

## 2022-10-18 PROCEDURE — G8417 CALC BMI ABV UP PARAM F/U: HCPCS | Performed by: PHYSICIAN ASSISTANT

## 2022-10-18 PROCEDURE — G8484 FLU IMMUNIZE NO ADMIN: HCPCS | Performed by: PHYSICIAN ASSISTANT

## 2022-10-18 PROCEDURE — 87426 SARSCOV CORONAVIRUS AG IA: CPT | Performed by: PHYSICIAN ASSISTANT

## 2022-10-18 PROCEDURE — 1036F TOBACCO NON-USER: CPT | Performed by: PHYSICIAN ASSISTANT

## 2022-10-18 NOTE — PROGRESS NOTES
10/18/22  Sinan Padgett : 1940 Sex: male  Age 79 y.o. Subjective:  Chief Complaint   Patient presents with    Pharyngitis    Congestion    Cough         HPI:   Sinan Padgett , 79 y.o. male presents to express care for evaluation of sore throat, cough, congestion    HPI  68-year-old male presents to express care for evaluation of sore throat, cough, congestion. The patient's had the symptoms ongoing for the last couple of days. The patient thought that it was just a typical cold and started taking amoxicillin. The patient is still taking amoxicillin. Did a home test that was questionably positive and they came in for testing. The patient has had COVID-vaccine. The patient has had booster. He is not previously had COVID. Not currently on any antibiotics. The patient is not any abdominal pain. ROS:   Unless otherwise stated in this report the patient's positive and negative responses for review of systems for constitutional, eyes, ENT, cardiovascular, respiratory, gastrointestinal, neurological, , musculoskeletal, and integument systems and related systems to the presenting problem are either stated in the history of present illness or were not pertinent or were negative for the symptoms and/or complaints related to the presenting medical problem. Positives and pertinent negatives as per HPI. All others reviewed and are negative.       PMH:     Past Medical History:   Diagnosis Date    Aortic valve regurgitation     Arrhythmia     Atrial fibrillation (HCC)     Decreased dorsalis pedis pulse 2/3/2022    Dizziness     GERD (gastroesophageal reflux disease)     Heartburn     History of stress test     Hypertension     Leg swelling 2/3/2022    Lightheadedness     Lymphedema of both lower extremities 2/3/2022    Seizure ([address]     Shoulder pain        Past Surgical History:   Procedure Laterality Date    JOINT REPLACEMENT Bilateral     OTHER SURGICAL HISTORY      Ablation Cardiac    PACEMAKER PLACEMENT      TONSILLECTOMY         Family History   Problem Relation Age of Onset    Other Mother         brain aneurysm    Stroke Father     Other Father         pacemaker    Cancer Sister        Medications:     Current Outpatient Medications:     metoprolol succinate (TOPROL XL) 50 MG extended release tablet, Take 1 tablet by mouth daily, Disp: 90 tablet, Rfl: 3    apixaban (ELIQUIS) 5 MG TABS tablet, Take 1 tablet by mouth 2 times daily, Disp: 180 tablet, Rfl: 3    losartan (COZAAR) 50 MG tablet, Take 1 tablet by mouth daily, Disp: 90 tablet, Rfl: 3    hydroCHLOROthiazide (MICROZIDE) 12.5 MG capsule, Take 1 capsule by mouth daily, Disp: 90 capsule, Rfl: 2    Elastic Bandages & Supports (JOBST KNEE HIGH COMPRESSION SM) MISC, Knee high with 20- 30 mmhg of compression, Disp: 1 each, Rfl: 10    omeprazole (PRILOSEC) 40 MG delayed release capsule, Take 1 capsule by mouth daily, Disp: 90 capsule, Rfl: 3    levETIRAcetam (KEPPRA) 500 MG tablet, Take 1 tablet by mouth twice daily, Disp: 60 tablet, Rfl: 11    tamsulosin (FLOMAX) 0.4 MG capsule, Take 0.4 mg by mouth daily, Disp: , Rfl:     Allergies: Allergies   Allergen Reactions    Chicken Allergy     Shellfish-Derived Products     Turkey-Sweet Potatoes-Peaches [Alimentum]        Social History:     Social History     Tobacco Use    Smoking status: Never    Smokeless tobacco: Never   Vaping Use    Vaping Use: Never used   Substance Use Topics    Alcohol use: Yes     Alcohol/week: 2.0 standard drinks     Types: 1 Cans of beer, 1 Shots of liquor per week     Comment: 2 drinks 5 days a week    Drug use: No       Patient lives at home. Physical Exam:     Vitals:    10/18/22 1339   BP: 130/64   Site: Right Upper Arm   Position: Sitting   Cuff Size: Medium Adult   Pulse: 78   Resp: 20   Temp: 98.3 °F (36.8 °C)   TempSrc: Temporal   SpO2: 98%   Weight: 202 lb (91.6 kg)   Height: 6' (1.829 m)       Exam:  Physical Exam  Nurse's notes and vital signs reviewed.  The patient is not hypoxic. ? General: Alert, no acute distress, patient resting comfortably Patient is not toxic or lethargic. Skin: Warm, intact, no pallor noted. There is no evidence of rash at this time. Head: Normocephalic, atraumatic  Eye: Normal conjunctiva  Ears, Nose, Throat: Right tympanic membrane clear, left tympanic membrane clear. No drainage or discharge noted. No pre- or post-auricular tenderness, erythema, or swelling noted. Minimal congestion  Posterior oropharynx shows no erythema, tonsillar hypertrophy, or exudate. the uvula is midline. No trismus or drooling is noted. Moist mucous membranes. Neck: No anterior/posterior lymphadenopathy noted. No erythema, no masses, no fluctuance or induration noted. No meningeal signs. Cardiovascular: Regular Rate and Rhythm  Respiratory: No acute distress, no rhonchi, wheezing or crackles noted. No stridor or retractions are noted. Neurological: A&O x4, normal speech  Psychiatric: Cooperative       Testing:     Results for orders placed or performed in visit on 10/18/22   POCT COVID-19, Antigen   Result Value Ref Range    SARS-COV-2, POC Not-Detected Not Detected    Lot Number [id number]     QC Pass/Fail Pass     Performing Instrument BD Veritor            Medical Decision Making:     Vital signs reviewed    Past medical history reviewed. Allergies reviewed. Medications reviewed. Patient on arrival does not appear to be in any apparent distress or discomfort. The patient has been seen and evaluated. The patient does not appear to be toxic or lethargic. COVID test negative. COVID PCR test pending. Patient will continue the amoxicillin    The patient was educated on the proper dosage of motrin and tylenol and the appropriate intervals of each. The patient is to increase fluid intake over the next several days. The patient is to use OTC decongestant as needed.      The patient is to return to express care or go directly to the emergency department should any of the signs or symptoms worsen. The patient is to followup with primary care physician in 2-3 days for repeat evaluation. The patient has no other questions or concerns at this time the patient will be discharged home. Clinical Impression:   Jeana Patel was seen today for pharyngitis, congestion and cough. Diagnoses and all orders for this visit:    Suspected COVID-19 virus infection    Cough, unspecified type  -     POCT COVID-19, Antigen  -     COVID-19 Ambulatory; Future    Nasal congestion      The patient is to call for any concerns or return if any of the signs or symptoms worsen. The patient is to follow-up with PCP in the next 2-3 days for repeat evaluation repeat assessment or go directly to the emergency department.      SIGNATURE: Hugo Springer III, PA-C

## 2022-10-21 ENCOUNTER — TELEPHONE (OUTPATIENT)
Dept: PRIMARY CARE CLINIC | Age: 82
End: 2022-10-21

## 2022-10-21 DIAGNOSIS — J06.9 UPPER RESPIRATORY TRACT INFECTION, UNSPECIFIED TYPE: Primary | ICD-10-CM

## 2022-10-21 RX ORDER — AZITHROMYCIN 250 MG/1
250 TABLET, FILM COATED ORAL SEE ADMIN INSTRUCTIONS
Qty: 6 TABLET | Refills: 0 | Status: SHIPPED | OUTPATIENT
Start: 2022-10-21 | End: 2022-10-26

## 2022-10-21 NOTE — TELEPHONE ENCOUNTER
Patient states he has head congestion, yellow green pnd. No fever. Negative covid x2 by [id number] [facility] express [address] [place] [address].

## 2022-10-21 NOTE — TELEPHONE ENCOUNTER
----- Message from Alicia Mercado sent at 10/21/2022  9:22 AM EDT -----  Subject: Refill Request    QUESTIONS  Name of Medication? Other - ZPack   Patient-reported dosage and instructions? as instructed   How many days do you have left? 0  Preferred Pharmacy? [facility] phone number (if available)? [phone]  Additional Information for Provider? patient tested Negative for Covid,   but he has a really nasty cold   ---------------------------------------------------------------------------  --------------  CALL BACK INFO  What is the best way for the office to contact you? OK to leave message on   voicemail  Preferred Call Back Phone Number? [phone]  ---------------------------------------------------------------------------  --------------  SCRIPT ANSWERS  Relationship to Patient?  Self

## 2022-11-01 ENCOUNTER — TELEPHONE (OUTPATIENT)
Dept: PRIMARY CARE CLINIC | Age: 82
End: 2022-11-01

## 2022-11-01 DIAGNOSIS — J06.9 UPPER RESPIRATORY TRACT INFECTION, UNSPECIFIED TYPE: Primary | ICD-10-CM

## 2022-11-01 RX ORDER — DOXYCYCLINE HYCLATE 100 MG
100 TABLET ORAL 2 TIMES DAILY
Qty: 14 TABLET | Refills: 0 | Status: SHIPPED
Start: 2022-11-01 | End: 2022-11-08

## 2022-11-01 NOTE — TELEPHONE ENCOUNTER
Pt phoned stating he took his zpack and still not feeling better ,he has a cough (yellow) drainage head congestion. Please advise? [Negative] : Genitourinary

## 2022-11-08 ENCOUNTER — OFFICE VISIT (OUTPATIENT)
Dept: PRIMARY CARE CLINIC | Age: 82
End: 2022-11-08
Payer: MEDICARE

## 2022-11-08 VITALS
DIASTOLIC BLOOD PRESSURE: 60 MMHG | RESPIRATION RATE: 16 BRPM | OXYGEN SATURATION: 96 % | TEMPERATURE: 97.6 F | HEART RATE: 80 BPM | WEIGHT: 198 LBS | HEIGHT: 72 IN | SYSTOLIC BLOOD PRESSURE: 118 MMHG | BODY MASS INDEX: 26.82 KG/M2

## 2022-11-08 DIAGNOSIS — R05.9 COUGH, UNSPECIFIED TYPE: ICD-10-CM

## 2022-11-08 DIAGNOSIS — I10 ESSENTIAL HYPERTENSION: ICD-10-CM

## 2022-11-08 DIAGNOSIS — R79.89 ELEVATED SERUM CREATININE: ICD-10-CM

## 2022-11-08 DIAGNOSIS — G40.409 GRAND MAL SEIZURE DISORDER (HCC): ICD-10-CM

## 2022-11-08 DIAGNOSIS — Z86.79 HISTORY OF ATRIAL FIBRILLATION: ICD-10-CM

## 2022-11-08 DIAGNOSIS — Z79.01 ANTICOAGULATED: ICD-10-CM

## 2022-11-08 DIAGNOSIS — J45.901 MODERATE ASTHMA WITH ACUTE EXACERBATION, UNSPECIFIED WHETHER PERSISTENT: Primary | ICD-10-CM

## 2022-11-08 PROCEDURE — G8427 DOCREV CUR MEDS BY ELIG CLIN: HCPCS | Performed by: INTERNAL MEDICINE

## 2022-11-08 PROCEDURE — 99213 OFFICE O/P EST LOW 20 MIN: CPT | Performed by: INTERNAL MEDICINE

## 2022-11-08 PROCEDURE — 1036F TOBACCO NON-USER: CPT | Performed by: INTERNAL MEDICINE

## 2022-11-08 PROCEDURE — G8417 CALC BMI ABV UP PARAM F/U: HCPCS | Performed by: INTERNAL MEDICINE

## 2022-11-08 PROCEDURE — 1123F ACP DISCUSS/DSCN MKR DOCD: CPT | Performed by: INTERNAL MEDICINE

## 2022-11-08 PROCEDURE — 3074F SYST BP LT 130 MM HG: CPT | Performed by: INTERNAL MEDICINE

## 2022-11-08 PROCEDURE — G8484 FLU IMMUNIZE NO ADMIN: HCPCS | Performed by: INTERNAL MEDICINE

## 2022-11-08 PROCEDURE — 3078F DIAST BP <80 MM HG: CPT | Performed by: INTERNAL MEDICINE

## 2022-11-08 RX ORDER — METHYLPREDNISOLONE 4 MG/1
TABLET ORAL
Qty: 1 KIT | Refills: 0 | Status: SHIPPED | OUTPATIENT
Start: 2022-11-08 | End: 2022-11-14

## 2022-11-08 RX ORDER — ALBUTEROL SULFATE 90 UG/1
2 AEROSOL, METERED RESPIRATORY (INHALATION) 4 TIMES DAILY PRN
Qty: 18 G | Refills: 2 | Status: SHIPPED | OUTPATIENT
Start: 2022-11-08

## 2022-11-08 NOTE — PROGRESS NOTES
Berna Guerrero  11/8/22     Chief Complaint   Patient presents with    Congestion     COUGH (CLEAR)        Allergies   Allergen Reactions    Chicken Allergy     Shellfish-Derived Products     Turkey-Sweet Potatoes-Peaches [Alimentum]         Current Outpatient Medications   Medication Sig Dispense Refill    methylPREDNISolone (MEDROL DOSEPACK) 4 MG tablet Take by mouth. 1 kit 0    albuterol sulfate HFA (VENTOLIN HFA) 108 (90 Base) MCG/ACT inhaler Inhale 2 puffs into the lungs 4 times daily as needed for Wheezing 18 g 2    metoprolol succinate (TOPROL XL) 50 MG extended release tablet Take 1 tablet by mouth daily 90 tablet 3    apixaban (ELIQUIS) 5 MG TABS tablet Take 1 tablet by mouth 2 times daily 180 tablet 3    losartan (COZAAR) 50 MG tablet Take 1 tablet by mouth daily 90 tablet 3    hydroCHLOROthiazide (MICROZIDE) 12.5 MG capsule Take 1 capsule by mouth daily 90 capsule 2    Elastic Bandages & Supports (JOBST KNEE HIGH COMPRESSION SM) MISC Knee high with 20- 30 mmhg of compression 1 each 10    omeprazole (PRILOSEC) 40 MG delayed release capsule Take 1 capsule by mouth daily 90 capsule 3    levETIRAcetam (KEPPRA) 500 MG tablet Take 1 tablet by mouth twice daily 60 tablet 11    tamsulosin (FLOMAX) 0.4 MG capsule Take 0.4 mg by mouth daily       No current facility-administered medications for this visit. HPI:     Review of Systems: as per HPI      Physical Exam:    Vitals:    11/08/22 1346   BP: 118/60   Pulse: 80   Resp: 16   Temp: 97.6 °F (36.4 °C)   SpO2: 96%       Patient is a 79 y.o. male. Patient appears to be in no distress. Breathing comfortably. Ambulates without assistance. HEENT: normal.  Neck supple, no adenopathy or bruits. Heart RR, no MGR. Lungs clear. Abd: normal  Ext: no edema. Peripheral pulses: normal.  No neurologic deficits noted.     Lab Results   Component Value Date    WBC 5.6 09/09/2022    HGB 14.3 09/09/2022    HCT 41.0 09/09/2022     09/09/2022    CHOL 156 07/21/2022 TRIG 162 (H) 07/21/2022    HDL 31 07/21/2022    ALT 16 07/21/2022    AST 20 07/21/2022    TSH 1.030 01/20/2022    PSA 15.21 (H) 05/24/2022    INR 1.3 05/09/2021    LABA1C 4.7 07/19/2021      Lab Results   Component Value Date     07/21/2022    K 4.6 07/21/2022     07/21/2022    CO2 25 07/21/2022    BUN 22 07/21/2022    CREATININE 1.5 (H) 07/21/2022    GLUCOSE 106 (H) 07/21/2022    CALCIUM 9.0 07/21/2022    PROT 6.9 07/21/2022    LABALBU 3.9 07/21/2022    BILITOT 0.8 07/21/2022    ALKPHOS 63 07/21/2022    AST 20 07/21/2022    ALT 16 07/21/2022    LABGLOM 45 07/21/2022    GFRAA 54 07/21/2022            Assessment:      Moderate asthma with acute exacerbation, unspecified whether persistent    Cough, unspecified type  -     XR CHEST (2 VW); Future    Other orders  -     methylPREDNISolone (MEDROL DOSEPACK) 4 MG tablet; Take by mouth.  -     albuterol sulfate HFA (VENTOLIN HFA) 108 (90 Base) MCG/ACT inhaler; Inhale 2 puffs into the lungs 4 times daily as needed for Wheezing        Discussion Notes: We will start her on a Medrol Dosepak and albuterol HFA inhaler 2 puffs every 4 hours as needed for wheezing. We will get a chest x-ray to rule out pneumonia. He is encouraged to stay adequately hydrated and will call in a couple of days to let us know if he is feeling better.

## 2022-11-09 ENCOUNTER — HOSPITAL ENCOUNTER (OUTPATIENT)
Age: 82
Discharge: HOME | End: 2022-11-11
Payer: MEDICARE

## 2022-11-09 ENCOUNTER — HOSPITAL ENCOUNTER (OUTPATIENT)
Dept: GENERAL RADIOLOGY | Age: 82
Discharge: HOME | End: 2022-11-11
Payer: MEDICARE

## 2022-11-09 DIAGNOSIS — R05.9 COUGH, UNSPECIFIED TYPE: ICD-10-CM

## 2022-11-09 PROCEDURE — 71046 X-RAY EXAM CHEST 2 VIEWS: CPT

## 2022-11-11 PROBLEM — I89.0 LYMPHEDEMA OF BOTH LOWER EXTREMITIES: Status: RESOLVED | Noted: 2022-01-01 | Resolved: 2022-01-01

## 2022-11-11 PROBLEM — M79.89 LEG SWELLING: Status: RESOLVED | Noted: 2022-02-03 | Resolved: 2022-11-11

## 2022-11-15 ENCOUNTER — OFFICE VISIT (OUTPATIENT)
Dept: PRIMARY CARE CLINIC | Age: 82
End: 2022-11-15
Payer: MEDICARE

## 2022-11-15 VITALS
OXYGEN SATURATION: 100 % | BODY MASS INDEX: 26.82 KG/M2 | WEIGHT: 198 LBS | HEIGHT: 72 IN | RESPIRATION RATE: 18 BRPM | HEART RATE: 86 BPM | SYSTOLIC BLOOD PRESSURE: 100 MMHG | DIASTOLIC BLOOD PRESSURE: 60 MMHG | TEMPERATURE: 97.8 F

## 2022-11-15 DIAGNOSIS — J45.41 MODERATE PERSISTENT ASTHMATIC BRONCHITIS WITH ACUTE EXACERBATION: Primary | ICD-10-CM

## 2022-11-15 PROCEDURE — 3074F SYST BP LT 130 MM HG: CPT | Performed by: INTERNAL MEDICINE

## 2022-11-15 PROCEDURE — 3078F DIAST BP <80 MM HG: CPT | Performed by: INTERNAL MEDICINE

## 2022-11-15 PROCEDURE — G8427 DOCREV CUR MEDS BY ELIG CLIN: HCPCS | Performed by: INTERNAL MEDICINE

## 2022-11-15 PROCEDURE — G8484 FLU IMMUNIZE NO ADMIN: HCPCS | Performed by: INTERNAL MEDICINE

## 2022-11-15 PROCEDURE — 99213 OFFICE O/P EST LOW 20 MIN: CPT | Performed by: INTERNAL MEDICINE

## 2022-11-15 PROCEDURE — 1036F TOBACCO NON-USER: CPT | Performed by: INTERNAL MEDICINE

## 2022-11-15 PROCEDURE — 1123F ACP DISCUSS/DSCN MKR DOCD: CPT | Performed by: INTERNAL MEDICINE

## 2022-11-15 PROCEDURE — G8417 CALC BMI ABV UP PARAM F/U: HCPCS | Performed by: INTERNAL MEDICINE

## 2022-11-15 RX ORDER — AZITHROMYCIN 250 MG/1
250 TABLET, FILM COATED ORAL SEE ADMIN INSTRUCTIONS
Qty: 6 TABLET | Refills: 0 | Status: SHIPPED | OUTPATIENT
Start: 2022-11-15 | End: 2022-11-20

## 2022-11-15 NOTE — PROGRESS NOTES
[facility]  11/15/22     Chief Complaint   Patient presents with    Asthma     Follow up         Allergies   Allergen Reactions    Chicken Allergy     Shellfish-Derived Products     Turkey-Sweet Potatoes-Peaches [Alimentum]         Current Outpatient Medications   Medication Sig Dispense Refill    azithromycin (ZITHROMAX) 250 MG tablet Take 1 tablet by mouth See Admin Instructions for 5 days 500mg on day 1 followed by 250mg on days 2 - 5 6 tablet 0    albuterol sulfate HFA (VENTOLIN HFA) 108 (90 Base) MCG/ACT inhaler Inhale 2 puffs into the lungs 4 times daily as needed for Wheezing 18 g 2    metoprolol succinate (TOPROL XL) 50 MG extended release tablet Take 1 tablet by mouth daily 90 tablet 3    apixaban (ELIQUIS) 5 MG TABS tablet Take 1 tablet by mouth 2 times daily 180 tablet 3    losartan (COZAAR) 50 MG tablet Take 1 tablet by mouth daily 90 tablet 3    hydroCHLOROthiazide (MICROZIDE) 12.5 MG capsule Take 1 capsule by mouth daily 90 capsule 2    Elastic Bandages & Supports (JOBST KNEE HIGH COMPRESSION SM) MISC Knee high with 20- 30 mmhg of compression 1 each 10    omeprazole (PRILOSEC) 40 MG delayed release capsule Take 1 capsule by mouth daily 90 capsule 3    levETIRAcetam (KEPPRA) 500 MG tablet Take 1 tablet by mouth twice daily 60 tablet 11    tamsulosin (FLOMAX) 0.4 MG capsule Take 0.4 mg by mouth daily       No current facility-administered medications for this visit. HPI: Patient comes in for follow-up visit he is still coughing quite a bit and his sputum turned yellow today. He denies any fever or chills. He was still using his albuterol HFA inhaler every 4 hours as needed. His chest x-ray is negative for pneumonia. He denies any chest pain or shortness of breath. Review of Systems: as per HPI      Physical Exam:    Vitals:    11/15/22 1005   BP: 100/60   Pulse: 86   Resp: 18   Temp: 97.8 °F (36.6 °C)   SpO2: 100%       Patient is a 79 y.o. male. Patient appears to be in no distress. Breathing comfortably. Ambulates without assistance. HEENT: normal.  Neck supple, no adenopathy or bruits. Heart RR, no MGR. Lungs: Scattered wheeze and rhonchi mainly over his right lung. Less so than last visit. Assessment:    Dilma Leone was seen today for asthma. Diagnoses and all orders for this visit:    Moderate persistent asthmatic bronchitis with acute exacerbation  -     azithromycin (ZITHROMAX) 250 MG tablet; Take 1 tablet by mouth See Admin Instructions for 5 days 500mg on day 1 followed by 250mg on days 2 - 5        Discussion Notes: He will continue albuterol HFA inhaler 2 puffs every 4 hours as needed bronchospasm. He will stay adequately hydrated and will prescribe Z-Ja as directed and return in 1 week for follow-up visit or call meantime if his symptoms getting worse.

## 2022-11-21 ENCOUNTER — OFFICE VISIT (OUTPATIENT)
Dept: PRIMARY CARE CLINIC | Age: 82
End: 2022-11-21
Payer: MEDICARE

## 2022-11-21 VITALS
HEART RATE: 68 BPM | RESPIRATION RATE: 18 BRPM | TEMPERATURE: 98 F | HEIGHT: 72 IN | BODY MASS INDEX: 26.82 KG/M2 | DIASTOLIC BLOOD PRESSURE: 68 MMHG | OXYGEN SATURATION: 98 % | SYSTOLIC BLOOD PRESSURE: 118 MMHG | WEIGHT: 198 LBS

## 2022-11-21 DIAGNOSIS — J45.21 MILD INTERMITTENT ASTHMATIC BRONCHITIS WITH ACUTE EXACERBATION: Primary | ICD-10-CM

## 2022-11-21 PROCEDURE — 3078F DIAST BP <80 MM HG: CPT | Performed by: INTERNAL MEDICINE

## 2022-11-21 PROCEDURE — G8484 FLU IMMUNIZE NO ADMIN: HCPCS | Performed by: INTERNAL MEDICINE

## 2022-11-21 PROCEDURE — 99213 OFFICE O/P EST LOW 20 MIN: CPT | Performed by: INTERNAL MEDICINE

## 2022-11-21 PROCEDURE — 3074F SYST BP LT 130 MM HG: CPT | Performed by: INTERNAL MEDICINE

## 2022-11-21 PROCEDURE — G8427 DOCREV CUR MEDS BY ELIG CLIN: HCPCS | Performed by: INTERNAL MEDICINE

## 2022-11-21 PROCEDURE — G8417 CALC BMI ABV UP PARAM F/U: HCPCS | Performed by: INTERNAL MEDICINE

## 2022-11-21 PROCEDURE — 1036F TOBACCO NON-USER: CPT | Performed by: INTERNAL MEDICINE

## 2022-11-21 PROCEDURE — 1123F ACP DISCUSS/DSCN MKR DOCD: CPT | Performed by: INTERNAL MEDICINE

## 2022-11-21 NOTE — PROGRESS NOTES
Jeannine Salazar  11/21/22     Chief Complaint   Patient presents with    Congestion     Ongoing cold symptoms         Allergies   Allergen Reactions    Chicken Allergy     Shellfish-Derived Products     Turkey-Sweet Potatoes-Peaches [Alimentum]         Current Outpatient Medications   Medication Sig Dispense Refill    albuterol sulfate HFA (VENTOLIN HFA) 108 (90 Base) MCG/ACT inhaler Inhale 2 puffs into the lungs 4 times daily as needed for Wheezing 18 g 2    metoprolol succinate (TOPROL XL) 50 MG extended release tablet Take 1 tablet by mouth daily 90 tablet 3    apixaban (ELIQUIS) 5 MG TABS tablet Take 1 tablet by mouth 2 times daily 180 tablet 3    losartan (COZAAR) 50 MG tablet Take 1 tablet by mouth daily 90 tablet 3    hydroCHLOROthiazide (MICROZIDE) 12.5 MG capsule Take 1 capsule by mouth daily 90 capsule 2    Elastic Bandages & Supports (JOBST KNEE HIGH COMPRESSION SM) MISC Knee high with 20- 30 mmhg of compression 1 each 10    omeprazole (PRILOSEC) 40 MG delayed release capsule Take 1 capsule by mouth daily 90 capsule 3    levETIRAcetam (KEPPRA) 500 MG tablet Take 1 tablet by mouth twice daily 60 tablet 11    tamsulosin (FLOMAX) 0.4 MG capsule Take 0.4 mg by mouth daily       No current facility-administered medications for this visit. HPI: Pt comes in for follow up visit. He is feeling better. Coughing less and less congested. Review of Systems: as per HPI      Physical Exam:    Vitals:    11/21/22 1016   BP: 118/68   Pulse: 68   Resp: 18   Temp: 98 °F (36.7 °C)   SpO2: 98%       Patient is a 79 y.o. male. Patient appears to be in no distress. Breathing comfortably. Ambulates without assistance. HEENT: normal.  Neck supple, no adenopathy or bruits. Heart RR, no MGR. Lungs clear. Abd: normal  Ext: no edema. Peripheral pulses: normal.  No neurologic deficits noted.          Assessment:      Mild intermittent asthmatic bronchitis with acute exacerbation, improved with current treatment. Discussion Notes: Continue albuterol HFA inhaler as needed. May resume normal activity. Return as needed.

## 2022-12-05 NOTE — PATIENT INSTRUCTIONS
Personalized Preventive Plan for Jeannine Salazar - 12/5/2022  Medicare offers a range of preventive health benefits. Some of the tests and screenings are paid in full while other may be subject to a deductible, co-insurance, and/or copay. Some of these benefits include a comprehensive review of your medical history including lifestyle, illnesses that may run in your family, and various assessments and screenings as appropriate. After reviewing your medical record and screening and assessments performed today your provider may have ordered immunizations, labs, imaging, and/or referrals for you. A list of these orders (if applicable) as well as your Preventive Care list are included within your After Visit Summary for your review. Other Preventive Recommendations:    A preventive eye exam performed by an eye specialist is recommended every 1-2 years to screen for glaucoma; cataracts, macular degeneration, and other eye disorders. A preventive dental visit is recommended every 6 months. Try to get at least 150 minutes of exercise per week or 10,000 steps per day on a pedometer . Order or download the FREE \"Exercise & Physical Activity: Your Everyday Guide\" from [employer]. Call 6-783.172.5935 or search [employer] online. You need 8163-9544 mg of calcium and 0498-3428 IU of vitamin D per day. It is possible to meet your calcium requirement with diet alone, but a vitamin D supplement is usually necessary to meet this goal.  When exposed to the sun, use a sunscreen that protects against both UVA and UVB radiation with an SPF of 30 or greater. Reapply every 2 to 3 hours or after sweating, drying off with a towel, or swimming. Always wear a seat belt when traveling in a car. Always wear a helmet when riding a bicycle or motorcycle.

## 2022-12-05 NOTE — PROGRESS NOTES
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  Recommended screening schedule for the next 5-10 years is provided to the patient in written form: see Patient Instructions/AVS.     Return for Medicare Annual Wellness Visit in 1 year. Subjective   Patient comes in for his annual wellness visit. He is now 79years of age. Currently feels. He is breathing easier and his chest congestion and cough have improved. He remains on all of his usual meds and supplements the same as listed on his med list, which was reviewed with him. Patient's complete Health Risk Assessment and screening values have been reviewed and are found in Flowsheets. The following problems were reviewed today and where indicated follow up appointments were made and/or referrals ordered. Positive Risk Factor Screenings with Interventions:               Health Habits/Nutrition Interventions:  He is advised to try to maintain a heart healthy diet and exercise as tolerated. Vision Screen:  Do you have difficulty driving, watching TV, or doing any of your daily activities because of your eyesight?: No  Have you had an eye exam within the past year?: (!) No  No results found.   Hearing/Vision Interventions:  Is advised to get a hearing evaluation and an annual eye exam.    Safety:  Do you always fasten your seatbelt when you are in a car?: (!) No  Safety Interventions:  Home safety tips provided           Objective   Vitals:    12/05/22 0802   BP: 138/70   Pulse: 78   Resp: 18   Temp: 97.8 °F (36.6 °C)   SpO2: 95%   Weight: 195 lb (88.5 kg)   Height: 6' (1.829 m)      Body mass index is 26.45 kg/m². Exam: Patient is alert and oriented and breathing comfortably. HEENT normal.  Neck supple adenopathy or bruits. Heart regular rhythm no murmurs gallops or rubs. Lungs clear. Abdomen normal.  Extremities no edema. Peripheral pulses normal.  No neurologic deficits noted. Allergies   Allergen Reactions    Chicken Allergy     Shellfish-Derived Products     Turkey-Sweet Potatoes-Peaches [Alimentum]      Prior to Visit Medications    Medication Sig Taking?  Authorizing Provider   albuterol sulfate HFA (VENTOLIN HFA) 108 (90 Base) MCG/ACT inhaler Inhale 2 puffs into the lungs 4 times daily as needed for Wheezing Yes Bruna Forde MD   metoprolol succinate (TOPROL XL) 50 MG extended release tablet Take 1 tablet by mouth daily Yes Bruna Forde MD   apixaban (ELIQUIS) 5 MG TABS tablet Take 1 tablet by mouth 2 times daily Yes Bruna Forde MD   losartan (COZAAR) 50 MG tablet Take 1 tablet by mouth daily Yes Bruna Forde MD   hydroCHLOROthiazide (MICROZIDE) 12.5 MG capsule Take 1 capsule by mouth daily Yes Bruna Forde MD   Elastic Bandages & Supports (JOBST KNEE HIGH COMPRESSION SM) MISC Knee high with 20- 30 mmhg of compression Yes Jackson Henry MD   omeprazole (PRILOSEC) 40 MG delayed release capsule Take 1 capsule by mouth daily Yes Bruna Forde MD   levETIRAcetam (KEPPRA) 500 MG tablet Take 1 tablet by mouth twice daily Yes Fadia Collins MD   tamsulosin (FLOMAX) 0.4 MG capsule Take 0.4 mg by mouth daily Yes Historical Provider, MD Young (Including outside providers/suppliers regularly involved in providing care):   Patient Care Team:  Bruna Forde MD as PCP - General (Internal Medicine)  Bruna Forde MD as PCP - [facility] Empaneled Provider  Audrey Goldman MD as Surgeon (Urology)     Reviewed and updated this visit:  Allergies  Meds       Discussion: Patient will continue all of his current meds and supplements the same as listed on his med list.  He is given a flu shot today and will return in 2 weeks for his Prevnar 13. He had his Pneumovax 23 in 2020. He is encouraged to try to maintain a low-cholesterol, heart healthy diet and exercise as tolerated and is due to return on 1/31/2023 for his routine 6-month follow-up visit.

## 2022-12-08 RX ORDER — LEVETIRACETAM 500 MG/1
TABLET ORAL
Qty: 60 TABLET | Refills: 0 | OUTPATIENT
Start: 2022-12-08

## 2022-12-08 NOTE — ED NOTES
Wife Danika would like [facility] home in [facility]  12/07/22 1129

## 2022-12-08 NOTE — ED NOTES
Spoke to Kane James at [facility] and provided requested information. They are aware that the body is in the morgue and will arrange for transport.      Myrna Murguia RN  12/07/22 0935

## 2022-12-08 NOTE — ED PROVIDER NOTES
HISTORY OF PRESENT ILLNESS:  (Nurses Notes Reviewed)    Chief Complaint:   No chief complaint on file. Source of history provided by:  EMS personnel. History/Exam Limitations: due to condition. Jerrod Honeycutt is a 79 y.o. old male presenting to the emergency department, for cardiac arrest, which occured at 6:12pm, patient was in vfib shocked 3 times, PEA after 4th time until arrival. Patient was given epi x3 and remained PEA. He has a history of HTN, Afib, Seizures. Code Status on file: Prior. Duration:  Down time from arrest until ambulance arrival minutes. Total Time from arrest until hospital arrival minutes. Onset:  sudden. Witnessed: yes. Available History:      Prior Cardiac Disease:   Yes. Drug Use/Overdose ? Unknown. Drowning ? No.     GI Bleeding ? Unknown. Hypothermia ? No.     Other:   N/A. Initial Rhythm: Vfib. Prehospital Treatment:       CPR:   yes. Intubation:   yes- brendan airway     IV Established:   yes     Defibrillation:   yes     External Pacer:   no     Epinephrine:   yes     Atropine:   no     Response to Treatment:  Transient return of pulse: No.                                               Sustained return of pulse:  No.  Associated Signs and Symptoms (preceding arrest):  Patient reported to wife no feeling well. Other History:   not currently available. PAST MEDICAL, SURGICAL, SOCIAL AND FAMILY HISTORY SECTION    Past Medical History:  has a past medical history of Aortic valve regurgitation, Arrhythmia, Atrial fibrillation (HCC), Decreased dorsalis pedis pulse, Dizziness, GERD (gastroesophageal reflux disease), Heartburn, History of stress test, Hypertension, Leg swelling, Lightheadedness, Lymphedema of both lower extremities, Seizure ([address] and Shoulder pain. Past Surgical History:  has a past surgical history that includes Tonsillectomy; pacemaker placement; other surgical history; and joint replacement (Bilateral).     Social History:  reports that he has never smoked. He has never used smokeless tobacco. He reports current alcohol use of about 2.0 standard drinks per week. He reports that he does not use drugs. Family History: family history includes Cancer in his sister; Other in his father and mother; Stroke in his father. The patients home medications have been reviewed. Allergies: Chicken allergy, Shellfish-derived products, and Turkey-sweet potatoes-peaches [alimentum]    REVIEW OF SYSTEMS:   Please note that portions of/or complete areas of the HPI, Past History or ROS, may be limited/incomplete due to this patients acuity of illness and/or lack of history availble at time of presentation to Emergency Department. Pertinent positives and negatives are stated within HPI, all other systems reviewed and are negative. PHYSICAL EXAM:   General: Unresponsive. Head: Atraumatic. Eyes: Fixed and dilated  ENT: Airway patent. ETT in place- brendan airway. Cardiovascular: Heart sounds are absent. Pulses are absent. Respiratory: No spontaneous respirations. Abdominal: distended. Musculoskeletal: No deformities. Skin: Pallor, cyanosis around face. Neurological: Unresponsive. Neurological exam limited due to clinical condition.       ------------------------------------------------ RESULTS ---------------------------------------------------    LABS  No results found for this visit on 12/07/22. RADIOLOGY  No orders to display       EKG:    None.        ---------------------------- NURSING NOTES AND VITALS REVIEWED -------------------------   The nursing notes within the ED encounter and vital signs as below have been reviewed. There were no vitals taken for this visit.   Oxygen Saturation Interpretation: Abnormal      ------------------------------------------PROGRESS NOTES -------------------------------------------    ED COURSE MEDICATIONS:              Medications - No data to display    Re-examination(s):         none. CONSULTATIONS:            Spoke with Dr. Ivon Plaza. He will fill out death certificate    PROCEDURES:            Intubation Procedure Note    Indication: airway protection    Consent: Unable to be obtained due to the emergent nature of this procedure. Medications Used: None    Procedure: The patient was placed in the appropriate position. Cricoid pressure was not required. Intubation was performed by direct laryngoscopy using a laryngoscope and a 7.5 cuffed endotracheal tube. The cuff was then inflated and the tube was secured appropriately at a distance of 24 cm to the dental ridge. Initial confirmation of placement included bilateral breath sounds. A chest x-ray to verify correct placement of the tube was not ordered. .     Complications: None     MDM  79 y.o. old male presenting to the emergency department, for cardiac arrest, which occured at 6:12pm, patient was in vfib shocked 3 times, PEA after 4th time until arrival. Patient was given epi x3 and remained PEA. Patient received 2 more rounds of epi in the ED, brendan airway switched to ETT. Patient was given bicarb and calcium, with no return of pulses. Time of death called at 65. Dr. Ivon Plaza called he agreed to fill death certificate. Counseling:   I have spoken with the spouse regarding the patient's treatment/condition at this time. All questions answered. --------------------------------------- IMPRESSION & DISPOSITION --------------------------------     IMPRESSION:  1.  Cardiac arrest [facility])        This patient's ED course included: a personal history and physicial examination, re-evaluation prior to disposition, multiple bedside re-evaluations, IV medications, cardiac monitoring, continuous pulse oximetry, and complex medical decision making and emergency management    This patient has remained hemodynamically stable during their ED course. DISPOSITION:  Patient condition is .           Helio Overton MD  Resident  [phone]

## 2022-12-08 NOTE — ED NOTES
1859 pt arrives in ED  1900 epi given  1900 pulse check- asystole  1901 bicarb given  1902 calcium given  1903 epi given  1903 pulse check- asystole  1906 pulse check- asystole.  End tital 8  1907 TOD per Dr. Mary Cuevas, RN  12/07/22 3972

## 2022-12-08 NOTE — ED NOTES
Received call from Ary Hunter 41 at [address]. Body released at this time. Barbara will contact  home.      Rosa Tafoya, RN  [phone]